# Patient Record
Sex: FEMALE | Race: WHITE | Employment: OTHER | URBAN - METROPOLITAN AREA
[De-identification: names, ages, dates, MRNs, and addresses within clinical notes are randomized per-mention and may not be internally consistent; named-entity substitution may affect disease eponyms.]

---

## 2017-02-23 ENCOUNTER — OFFICE VISIT (OUTPATIENT)
Dept: INTERNAL MEDICINE CLINIC | Age: 57
End: 2017-02-23

## 2017-02-23 VITALS
WEIGHT: 121 LBS | TEMPERATURE: 98 F | OXYGEN SATURATION: 98 % | RESPIRATION RATE: 16 BRPM | SYSTOLIC BLOOD PRESSURE: 98 MMHG | DIASTOLIC BLOOD PRESSURE: 55 MMHG | BODY MASS INDEX: 22.84 KG/M2 | HEART RATE: 81 BPM | HEIGHT: 61 IN

## 2017-02-23 DIAGNOSIS — E78.00 PURE HYPERCHOLESTEROLEMIA: ICD-10-CM

## 2017-02-23 DIAGNOSIS — Z00.00 ROUTINE GENERAL MEDICAL EXAMINATION AT A HEALTH CARE FACILITY: Primary | ICD-10-CM

## 2017-02-23 DIAGNOSIS — F34.1 DYSTHYMIA: ICD-10-CM

## 2017-02-23 RX ORDER — DULOXETIN HYDROCHLORIDE 30 MG/1
30 CAPSULE, DELAYED RELEASE ORAL DAILY
Qty: 30 CAP | Refills: 2 | Status: SHIPPED | OUTPATIENT
Start: 2017-02-23 | End: 2017-08-23

## 2017-02-23 NOTE — PROGRESS NOTES
HISTORY OF PRESENT ILLNESS  Ham Bourne is a 61 y.o. female. HPI   Subjective:   Ham Bourne is a 61 y.o. female with hyperlipidemia. Cardiovascular risk analysis - 61 y.o. female LDL goal is under 130. ROS: taking medications as instructed, no medication side effects noted, no TIA's, no chest pain on exertion, no dyspnea on exertion, no swelling of ankles. Tolerating meds, no myalgias or other side effects noted  New concerns: stable   She has been struggling with fever/chills, sinus congestion- she is feeling better and feels tired ; She did get sad with anything as she could not work on her pelvic floor . Review of Systems   Constitutional: Negative for chills, diaphoresis, fever, malaise/fatigue and weight loss. HENT: Negative for congestion, ear pain, hearing loss, sore throat and tinnitus. Eyes: Negative for blurred vision and double vision. Respiratory: Negative for cough, hemoptysis, sputum production, shortness of breath and wheezing. Cardiovascular: Negative for chest pain, palpitations, orthopnea, claudication, leg swelling and PND. Gastrointestinal: Negative for abdominal pain, blood in stool, constipation, diarrhea, heartburn, nausea and vomiting. Genitourinary: Negative for dysuria, flank pain, frequency, hematuria and urgency. Musculoskeletal: Negative for back pain, joint pain, myalgias and neck pain. Skin: Negative. Neurological: Negative for dizziness, tingling, sensory change, speech change, focal weakness, seizures, loss of consciousness, weakness and headaches. Endo/Heme/Allergies: Negative for environmental allergies and polydipsia. Does not bruise/bleed easily. Psychiatric/Behavioral: Negative for depression, memory loss, substance abuse and suicidal ideas. The patient is not nervous/anxious and does not have insomnia. Physical Exam   Constitutional: She is oriented to person, place, and time. She appears well-developed and well-nourished. HENT:   Head: Normocephalic and atraumatic. Right Ear: External ear normal.   Left Ear: External ear normal.   Nose: Nose normal.   Mouth/Throat: Oropharynx is clear and moist.   Eyes: Conjunctivae and EOM are normal. Pupils are equal, round, and reactive to light. Neck: Normal range of motion. Neck supple. Carotid bruit is not present. No thyromegaly present. Cardiovascular: Normal rate, regular rhythm, S1 normal, S2 normal, normal heart sounds and intact distal pulses. Pulmonary/Chest: Effort normal and breath sounds normal.   Abdominal: Soft. Normal appearance and bowel sounds are normal. There is no hepatosplenomegaly. There is no tenderness. Musculoskeletal: Normal range of motion. Neurological: She is alert and oriented to person, place, and time. Skin: Skin is warm, dry and intact. Psychiatric: She has a normal mood and affect. Her behavior is normal. Judgment and thought content normal.   Nursing note and vitals reviewed. ASSESSMENT and PLAN  Mary Ann Huff was seen today for complete physical.    Diagnoses and all orders for this visit:    Routine general medical examination at a health care facility  -     CBC W/O DIFF  -     METABOLIC PANEL, COMPREHENSIVE  -     TSH 3RD GENERATION  -     VITAMIN D, 25 HYDROXY    Pure hypercholesterolemia  -     LIPID PANEL    Dysthymia /anxiety- she needs to take this right now as she is in pain and having emotional issues associated with the pain   -     DULoxetine (CYMBALTA) 30 mg capsule; Take 1 Cap by mouth daily.       lab results and schedule of future lab studies reviewed with patient  reviewed diet, exercise and weight control  cardiovascular risk and specific lipid/LDL goals reviewed  reviewed medications and side effects in detail

## 2017-02-24 LAB
25(OH)D3+25(OH)D2 SERPL-MCNC: 37.2 NG/ML (ref 30–100)
ALBUMIN SERPL-MCNC: 4.7 G/DL (ref 3.5–5.5)
ALBUMIN/GLOB SERPL: 1.8 {RATIO} (ref 1.1–2.5)
ALP SERPL-CCNC: 84 IU/L (ref 39–117)
ALT SERPL-CCNC: 9 IU/L (ref 0–32)
APPEARANCE UR: CLEAR
AST SERPL-CCNC: 21 IU/L (ref 0–40)
BILIRUB SERPL-MCNC: 0.2 MG/DL (ref 0–1.2)
BILIRUB UR QL STRIP: NEGATIVE
BUN SERPL-MCNC: 23 MG/DL (ref 6–24)
BUN/CREAT SERPL: 22 (ref 9–23)
CALCIUM SERPL-MCNC: 9.5 MG/DL (ref 8.7–10.2)
CHLORIDE SERPL-SCNC: 99 MMOL/L (ref 96–106)
CHOLEST SERPL-MCNC: 219 MG/DL (ref 100–199)
CO2 SERPL-SCNC: 21 MMOL/L (ref 18–29)
COLOR UR: YELLOW
CREAT SERPL-MCNC: 1.05 MG/DL (ref 0.57–1)
ERYTHROCYTE [DISTWIDTH] IN BLOOD BY AUTOMATED COUNT: 13.9 % (ref 12.3–15.4)
GLOBULIN SER CALC-MCNC: 2.6 G/DL (ref 1.5–4.5)
GLUCOSE SERPL-MCNC: 75 MG/DL (ref 65–99)
GLUCOSE UR QL: NEGATIVE
HCT VFR BLD AUTO: 43 % (ref 34–46.6)
HDLC SERPL-MCNC: 67 MG/DL
HGB BLD-MCNC: 14.2 G/DL (ref 11.1–15.9)
HGB UR QL STRIP: NEGATIVE
INTERPRETATION, 910389: NORMAL
INTERPRETATION: NORMAL
KETONES UR QL STRIP: NEGATIVE
LDLC SERPL CALC-MCNC: 134 MG/DL (ref 0–99)
LEUKOCYTE ESTERASE UR QL STRIP: NEGATIVE
MCH RBC QN AUTO: 31.3 PG (ref 26.6–33)
MCHC RBC AUTO-ENTMCNC: 33 G/DL (ref 31.5–35.7)
MCV RBC AUTO: 95 FL (ref 79–97)
MICRO URNS: NORMAL
NITRITE UR QL STRIP: NEGATIVE
PDF IMAGE, 910387: NORMAL
PH UR STRIP: 6.5 [PH] (ref 5–7.5)
PLATELET # BLD AUTO: 325 X10E3/UL (ref 150–379)
POTASSIUM SERPL-SCNC: 4.6 MMOL/L (ref 3.5–5.2)
PROT SERPL-MCNC: 7.3 G/DL (ref 6–8.5)
PROT UR QL STRIP: NEGATIVE
RBC # BLD AUTO: 4.53 X10E6/UL (ref 3.77–5.28)
SODIUM SERPL-SCNC: 141 MMOL/L (ref 134–144)
SP GR UR: 1.02 (ref 1–1.03)
TRIGL SERPL-MCNC: 90 MG/DL (ref 0–149)
TSH SERPL DL<=0.005 MIU/L-ACNC: 3.26 UIU/ML (ref 0.45–4.5)
UROBILINOGEN UR STRIP-MCNC: 0.2 MG/DL (ref 0.2–1)
VLDLC SERPL CALC-MCNC: 18 MG/DL (ref 5–40)
WBC # BLD AUTO: 6.7 X10E3/UL (ref 3.4–10.8)

## 2017-04-13 ENCOUNTER — OFFICE VISIT (OUTPATIENT)
Dept: INTERNAL MEDICINE CLINIC | Age: 57
End: 2017-04-13

## 2017-04-13 VITALS
OXYGEN SATURATION: 98 % | SYSTOLIC BLOOD PRESSURE: 79 MMHG | HEART RATE: 102 BPM | RESPIRATION RATE: 16 BRPM | BODY MASS INDEX: 22.43 KG/M2 | TEMPERATURE: 98.4 F | DIASTOLIC BLOOD PRESSURE: 50 MMHG | WEIGHT: 118.8 LBS | HEIGHT: 61 IN

## 2017-04-13 DIAGNOSIS — R68.89 FLU-LIKE SYMPTOMS: Primary | ICD-10-CM

## 2017-04-13 DIAGNOSIS — J06.9 UPPER RESPIRATORY TRACT INFECTION, UNSPECIFIED TYPE: ICD-10-CM

## 2017-04-13 RX ORDER — AZITHROMYCIN 250 MG/1
250 TABLET, FILM COATED ORAL SEE ADMIN INSTRUCTIONS
Qty: 6 TAB | Refills: 0 | Status: SHIPPED | OUTPATIENT
Start: 2017-04-13 | End: 2017-04-18

## 2017-04-13 NOTE — PROGRESS NOTES
HISTORY OF PRESENT ILLNESS  Moustapha Yates is a 61 y.o. female. HPI     She complains of sinus congestion with green mucus, cough, body aches, chills, bilateral ear pain, low grade fever, weakness, and fatigue. Reports her highest fever was 100.4 degrees. Pt states that body aches, chills, and fever started on 4/10/17. Pt states that she feels a little short of breath and feels like she is going to pass out with deep breathing. Pt states that yesterday she did not eat or drink anything. Pt denies chest tightness. Reports this is the second time she has been sick in the past two weeks. Pt states that last week with her cold she had a fever and her entire body broke out in a rash. Her initial bp was 79/50 today in the office and pt attributes this to dehydration as she has not been eating or drinking anything. I rechecked her bp and systolic pressure was 98. Review of Systems   Constitutional: Positive for chills, fever and malaise/fatigue. Positive for body aches   HENT: Positive for congestion and ear pain. Respiratory: Positive for cough. Neurological: Positive for weakness. All other systems reviewed and are negative. Physical Exam   Constitutional: She is oriented to person, place, and time. She appears well-developed and well-nourished. HENT:   Head: Normocephalic and atraumatic. Right Ear: External ear normal.   Left Ear: External ear normal.   Nose: Nose normal.   Mouth/Throat: Oropharynx is clear and moist.   Eyes: Conjunctivae and EOM are normal.   Neck: Normal range of motion. Neck supple. Carotid bruit is not present. No thyroid mass and no thyromegaly present. Cardiovascular: Normal rate, regular rhythm, S1 normal, S2 normal, normal heart sounds and intact distal pulses. Pulmonary/Chest: Effort normal and breath sounds normal.   Abdominal: Soft. Normal appearance and bowel sounds are normal. There is no hepatosplenomegaly. There is no tenderness.    Musculoskeletal: Normal range of motion. Neurological: She is alert and oriented to person, place, and time. She has normal strength. No cranial nerve deficit or sensory deficit. Coordination normal.   Skin: Skin is warm, dry and intact. No abrasion and no rash noted. Psychiatric: She has a normal mood and affect. Her behavior is normal. Judgment and thought content normal.   Nursing note and vitals reviewed. ASSESSMENT and PLAN  Sanjay Dixon was seen today for nasal congestion. Diagnoses and all orders for this visit:    Flu-like symptoms  Flu test was negative in the office today. Pt to begin taking Zpak. Pt needs to push fluids. If she continues to feel weak, not able to eat or drink, and still running a fever she should be seen in the ED. Upper respiratory tract infection, unspecified type  -     azithromycin (ZITHROMAX) 250 mg tablet; Take 1 Tab by mouth See Admin Instructions for 5 days. reviewed medications and side effects in detail     Written by Maura Sharma, as dictated by Tiera Arocs MD.     Current diagnosis and concerns discussed with pt at length. Understands risks and benefits or current treatment plan and medications and accepts the treatment and medication with any possible risks. Pt asks appropriate questions which were answered. Pt instructed to call with any concerns or problems.

## 2017-05-17 RX ORDER — SIMVASTATIN 20 MG/1
TABLET, FILM COATED ORAL
Qty: 90 TAB | Refills: 1 | Status: SHIPPED | OUTPATIENT
Start: 2017-05-17 | End: 2017-11-15 | Stop reason: SDUPTHER

## 2017-07-10 ENCOUNTER — TELEPHONE (OUTPATIENT)
Dept: INTERNAL MEDICINE CLINIC | Age: 57
End: 2017-07-10

## 2017-07-10 ENCOUNTER — OFFICE VISIT (OUTPATIENT)
Dept: INTERNAL MEDICINE CLINIC | Age: 57
End: 2017-07-10

## 2017-07-10 VITALS
WEIGHT: 125 LBS | BODY MASS INDEX: 23.6 KG/M2 | HEIGHT: 61 IN | OXYGEN SATURATION: 97 % | SYSTOLIC BLOOD PRESSURE: 108 MMHG | HEART RATE: 100 BPM | DIASTOLIC BLOOD PRESSURE: 73 MMHG | TEMPERATURE: 98.5 F | RESPIRATION RATE: 16 BRPM

## 2017-07-10 DIAGNOSIS — L03.116 CELLULITIS OF LEFT LOWER EXTREMITY: Primary | ICD-10-CM

## 2017-07-10 DIAGNOSIS — T14.8XXA BRUISING: ICD-10-CM
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
## 2017-07-10 RX ORDER — NITROFURANTOIN (MACROCRYSTALS) 100 MG/1
CAPSULE ORAL
COMMUNITY
Start: 2017-07-08 | End: 2017-07-17 | Stop reason: ALTCHOICE

## 2017-07-10 RX ORDER — TRAMADOL HYDROCHLORIDE 50 MG/1
TABLET ORAL
COMMUNITY
Start: 2017-07-08 | End: 2017-08-23

## 2017-07-10 RX ORDER — AMOXICILLIN AND CLAVULANATE POTASSIUM 875; 125 MG/1; MG/1
1 TABLET, FILM COATED ORAL EVERY 12 HOURS
Qty: 20 TAB | Refills: 0 | Status: SHIPPED | OUTPATIENT
Start: 2017-07-10 | End: 2017-08-23

## 2017-07-10 RX ORDER — CYCLOBENZAPRINE HCL 10 MG
TABLET ORAL
COMMUNITY
Start: 2017-07-08 | End: 2017-08-23
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
## 2017-07-17 ENCOUNTER — HOSPITAL ENCOUNTER (OUTPATIENT)
Dept: GENERAL RADIOLOGY | Age: 57
Discharge: HOME | End: 2017-07-17
Attending: INTERNAL MEDICINE
Payer: COMMERCIAL

## 2017-07-17 ENCOUNTER — OFFICE VISIT (OUTPATIENT)
Dept: INTERNAL MEDICINE CLINIC | Age: 57
End: 2017-07-17

## 2017-07-17 VITALS
WEIGHT: 122.8 LBS | BODY MASS INDEX: 23.18 KG/M2 | OXYGEN SATURATION: 98 % | HEIGHT: 61 IN | SYSTOLIC BLOOD PRESSURE: 97 MMHG | TEMPERATURE: 97.9 F | RESPIRATION RATE: 14 BRPM | DIASTOLIC BLOOD PRESSURE: 57 MMHG | HEART RATE: 110 BPM

## 2017-07-17 DIAGNOSIS — L03.90 CELLULITIS, UNSPECIFIED CELLULITIS SITE: ICD-10-CM

## 2017-07-17 DIAGNOSIS — M25.552 LEFT HIP PAIN: ICD-10-CM

## 2017-07-17 DIAGNOSIS — M54.50 ACUTE BILATERAL LOW BACK PAIN WITHOUT SCIATICA: ICD-10-CM

## 2017-07-17 DIAGNOSIS — M54.50 ACUTE BILATERAL LOW BACK PAIN WITHOUT SCIATICA: Primary | ICD-10-CM

## 2017-07-17 PROCEDURE — 72100 X-RAY EXAM L-S SPINE 2/3 VWS: CPT

## 2017-07-17 PROCEDURE — 73502 X-RAY EXAM HIP UNI 2-3 VIEWS: CPT

## 2017-07-17 PROCEDURE — 72202 X-RAY EXAM SI JOINTS 3/> VWS: CPT

## 2017-07-17 PROCEDURE — 72200 X-RAY EXAM SI JOINTS: CPT

## 2017-07-17 RX ORDER — HYDROCODONE BITARTRATE AND ACETAMINOPHEN 5; 325 MG/1; MG/1
1 TABLET ORAL
Qty: 14 TAB | Refills: 0 | Status: SHIPPED | OUTPATIENT
Start: 2017-07-17 | End: 2017-08-23

## 2017-07-17 NOTE — PROGRESS NOTES
HISTORY OF PRESENT ILLNESS  Isabela Braun is a 61 y.o. female. HPI   Presents today for follow up. Presents today with . Patient reports she has been feeling better. Patient reports the abrasion and cellulitis have been healing well. Patient reports she has been having trouble sleeping because of the back pain. She has run out of Tramadol and has been having trouble sleeping for the past few nights. Patient has been taking Tylenol with no relief. Review of Systems   All other systems reviewed and are negative. Physical Exam   Constitutional: She is oriented to person, place, and time. She appears well-developed and well-nourished. HENT:   Head: Normocephalic and atraumatic. Right Ear: External ear normal.   Left Ear: External ear normal.   Nose: Nose normal.   Mouth/Throat: Oropharynx is clear and moist.   Eyes: Conjunctivae and EOM are normal.   Neck: Normal range of motion. Neck supple. Carotid bruit is not present. No thyroid mass and no thyromegaly present. Cardiovascular: Normal rate, regular rhythm, S1 normal, S2 normal, normal heart sounds and intact distal pulses. Pulmonary/Chest: Effort normal and breath sounds normal.   Abdominal: Soft. Normal appearance and bowel sounds are normal. There is no hepatosplenomegaly. There is no tenderness. Musculoskeletal: Normal range of motion. 3x4cm hardened area on left lateral side. Suspect area is consistent with hematoma. Neurological: She is alert and oriented to person, place, and time. She has normal strength. No cranial nerve deficit or sensory deficit. Coordination normal.   Skin: Skin is warm, dry and intact. No abrasion and no rash noted. Psychiatric: She has a normal mood and affect. Her behavior is normal. Judgment and thought content normal.   Nursing note and vitals reviewed. ASSESSMENT and PLAN  Stacey Gaytan was seen today for follow-up.     Diagnoses and all orders for this visit:    Acute bilateral low back pain without sciatica  Patient has not had XR since the incident. Need to make sure nothing is broken. Discussed patient will need PT to help her back after she fully recovers. Patient will follow up with me when she needs the PT. Patient has run out of Tramadol and is having trouble sleeping because of the pain. -     XR SPINE LUMB 2 OR 3 V; Future  -     XR SI JTS MAX 2 V; Future  -     HYDROcodone-acetaminophen (NORCO) 5-325 mg per tablet; Take 1 Tab by mouth every four (4) hours as needed for Pain. Max Daily Amount: 6 Tabs. Left hip pain  Patient has not had XR since the incident. Need to make sure nothing is broken. Discussed patient will need PT to help her back after she fully recovers. Patient will follow up with me when she needs the PT.  -     XR HIP LT W OR WO PELV 2-3 VWS; Future  -     HYDROcodone-acetaminophen (NORCO) 5-325 mg per tablet; Take 1 Tab by mouth every four (4) hours as needed for Pain. Max Daily Amount: 6 Tabs. Cellulitis, unspecified cellulitis site  Cellulitis is much better. Keep watching the hardened area. Suspect area is consistent with hematoma. lab results and schedule of future lab studies reviewed with patient  reviewed diet, exercise and weight control    Written by Francisco J Jarrett, as dictated by Clifford Owens MD.     Current diagnosis and concerns discussed with pt at length. Understands risks and benefits or current treatment plan and medications and accepts the treatment and medication with any possible risks. Pt asks appropriate questions which were answered. Pt instructed to call with any concerns or problems.

## 2017-08-23 ENCOUNTER — OFFICE VISIT (OUTPATIENT)
Dept: INTERNAL MEDICINE CLINIC | Age: 57
End: 2017-08-23

## 2017-08-23 VITALS
HEIGHT: 61 IN | RESPIRATION RATE: 16 BRPM | HEART RATE: 79 BPM | OXYGEN SATURATION: 99 % | WEIGHT: 123 LBS | BODY MASS INDEX: 23.22 KG/M2 | DIASTOLIC BLOOD PRESSURE: 66 MMHG | TEMPERATURE: 98.2 F | SYSTOLIC BLOOD PRESSURE: 103 MMHG

## 2017-08-23 DIAGNOSIS — M62.89 PELVIC FLOOR DYSFUNCTION: ICD-10-CM

## 2017-08-23 DIAGNOSIS — E78.00 PURE HYPERCHOLESTEROLEMIA: Primary | ICD-10-CM
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
## 2017-11-16 ENCOUNTER — TELEPHONE (OUTPATIENT)
Dept: INTERNAL MEDICINE CLINIC | Age: 57
End: 2017-11-16

## 2017-11-16 RX ORDER — SIMVASTATIN 20 MG/1
TABLET, FILM COATED ORAL
Qty: 90 TAB | Refills: 1 | Status: SHIPPED | OUTPATIENT
Start: 2017-11-16 | End: 2018-05-13 | Stop reason: SDUPTHER

## 2017-12-20 ENCOUNTER — OFFICE VISIT (OUTPATIENT)
Dept: INTERNAL MEDICINE CLINIC | Age: 57
End: 2017-12-20

## 2017-12-20 VITALS
BODY MASS INDEX: 22.66 KG/M2 | HEART RATE: 73 BPM | SYSTOLIC BLOOD PRESSURE: 98 MMHG | OXYGEN SATURATION: 99 % | WEIGHT: 120 LBS | DIASTOLIC BLOOD PRESSURE: 63 MMHG | RESPIRATION RATE: 16 BRPM | TEMPERATURE: 98 F | HEIGHT: 61 IN

## 2017-12-20 DIAGNOSIS — J40 BRONCHITIS: Primary | ICD-10-CM

## 2017-12-20 RX ORDER — HYDROMORPHONE HYDROCHLORIDE 2 MG/1
TABLET ORAL
COMMUNITY
End: 2018-04-06 | Stop reason: ALTCHOICE

## 2017-12-20 RX ORDER — ACETAMINOPHEN 325 MG/1
TABLET ORAL
COMMUNITY

## 2017-12-20 RX ORDER — ALBUTEROL SULFATE 90 UG/1
1 AEROSOL, METERED RESPIRATORY (INHALATION)
Qty: 1 INHALER | Refills: 0 | Status: SHIPPED | OUTPATIENT
Start: 2017-12-20 | End: 2018-12-19 | Stop reason: ALTCHOICE

## 2017-12-20 RX ORDER — BENZONATATE 200 MG/1
200 CAPSULE ORAL
Qty: 21 CAP | Refills: 0 | Status: SHIPPED | OUTPATIENT
Start: 2017-12-20 | End: 2017-12-27

## 2017-12-20 RX ORDER — AZITHROMYCIN 250 MG/1
250 TABLET, FILM COATED ORAL SEE ADMIN INSTRUCTIONS
Qty: 6 TAB | Refills: 0 | Status: SHIPPED | OUTPATIENT
Start: 2017-12-20 | End: 2017-12-25

## 2017-12-20 NOTE — MR AVS SNAPSHOT
Visit Information Date & Time Provider Department Dept. Phone Encounter #  
 12/20/2017 10:00 AM Anoop Humphrey MD [facility] [phone] [id number] Your Appointments 2/27/2018  9:15 AM  
COMPLETE PHYSICAL with Nina Arana MD  
[facility]) Appt Note: cpe no pap [address] [place]  [id number]  
[phone]  
  
   
 [address] [facility] [id number] Upcoming Health Maintenance Date Due Pneumococcal 19-64 Highest Risk (2 of 3 - PCV13) 6/6/2017 PAP AKA CERVICAL CYTOLOGY 3/2/2018 COLONOSCOPY 11/16/2019 DTaP/Tdap/Td series (2 - Td) 6/16/2020 Allergies as of 12/20/2017  Review Complete On: 92/50/3735 By: Johnathon Rabago Severity Noted Reaction Type Reactions Codeine    Nausea Only Current Immunizations  Reviewed on 12/9/2014 Name Date Influenza Vaccine 11/25/2017, 10/15/2014 Influenza Vaccine Split 10/18/2010 Influenza Vaccine Whole 10/23/2011 TD Vaccine 5/28/1998 TDAP Vaccine 6/16/2010 Not reviewed this visit You Were Diagnosed With   
  
 Codes Comments Bronchitis    -  Primary ICD-10-CM: N40 ICD-9-CM: 035 Vitals BP Pulse Temp Resp Height(growth percentile) Weight(growth percentile) 98/63 (BP 1 Location: Left arm, BP Patient Position: Sitting) 73 98 °F (36.7 °C) (Oral) 16 5' 1\" (1.549 m) 120 lb (54.4 kg) SpO2 BMI OB Status Smoking Status 99% 22.67 kg/m2 Hysterectomy Never Smoker Vitals History BMI and BSA Data Body Mass Index Body Surface Area  
 22.67 kg/m 2 1.53 m 2 Preferred Pharmacy Pharmacy Name Phone [id number] [address], [address] [place] [address]  [phone] Your Updated Medication List  
  
   
This list is accurate as of: 12/20/17 10:48 AM.  Always use your most recent med list.  
  
  
  
  
 albuterol 90 mcg/actuation inhaler Commonly known as:  PROVENTIL HFA, VENTOLIN HFA, PROAIR HFA Take 1 Puff by inhalation every four (4) hours as needed for Wheezing. aspirin 81 mg tablet Take 81 mg by mouth daily. Indications: MYOCARDIAL INFARCTION PREVENTION  
  
 azithromycin 250 mg tablet Commonly known as:  Cristiane Ruel Take 1 Tab by mouth See Admin Instructions for 5 days. benzonatate 200 mg capsule Commonly known as:  TESSALON Take 1 Cap by mouth three (3) times daily as needed for Cough for up to 7 days. HYDROmorphone 2 mg tablet Commonly known as:  DILAUDID Take  by mouth every four (4) hours as needed for Pain. ibuprofen 200 mg tablet Commonly known as:  MOTRIN Take 400 mg by mouth every eight (8) hours as needed for Pain. M-VIT PO Take  by mouth daily. NEURONTIN 300 mg capsule Generic drug:  gabapentin Take 300 mg by mouth two (2) times a day. * simvastatin 40 mg tablet Commonly known as:  ZOCOR Take 1 Tab by mouth nightly for 90 days. * simvastatin 20 mg tablet Commonly known as:  ZOCOR  
TAKE 1 TABLET BY MOUTH EVERY NIGHT  
  
 TYLENOL 325 mg tablet Generic drug:  acetaminophen Take  by mouth every four (4) hours as needed for Pain. * Notice: This list has 2 medication(s) that are the same as other medications prescribed for you. Read the directions carefully, and ask your doctor or other care provider to review them with you. Prescriptions Sent to Pharmacy Refills  
 azithromycin (ZITHROMAX) 250 mg tablet 0 Sig: Take 1 Tab by mouth See Admin Instructions for 5 days. Class: Normal  
 Pharmacy: [id number]- [address] [facility], [address] [place] [address] Dr Ph #: [phone] Route: Oral  
 benzonatate (TESSALON) 200 mg capsule 0 Sig: Take 1 Cap by mouth three (3) times daily as needed for Cough for up to 7 days.   
 Class: Normal  
 [id number] [address] [facility], [id number] [place] [address] Dr Ph #: [phone] Route: Oral  
 albuterol (PROVENTIL HFA, VENTOLIN HFA, PROAIR HFA) 90 mcg/actuation inhaler 0 Sig: Take 1 Puff by inhalation every four (4) hours as needed for Wheezing. Class: Normal  
 [id number] [address] [facility], [address] [place] [address] Dr Ph #: [phone] Route: Inhalation Patient Instructions Bronchitis: Care Instructions Your Care Instructions Bronchitis is inflammation of the bronchial tubes, which carry air to the lungs. The tubes swell and produce mucus, or phlegm. The mucus and inflamed bronchial tubes make you cough. You may have trouble breathing. Most cases of bronchitis are caused by viruses like those that cause colds. Antibiotics usually do not help and they may be harmful. Bronchitis usually develops rapidly and lasts about 2 to 3 weeks in otherwise healthy people. Follow-up care is a key part of your treatment and safety. Be sure to make and go to all appointments, and call your doctor if you are having problems. It's also a good idea to know your test results and keep a list of the medicines you take. How can you care for yourself at home? · Take all medicines exactly as prescribed. Call your doctor if you think you are having a problem with your medicine. · Get some extra rest. 
· Take an over-the-counter pain medicine, such as acetaminophen (Tylenol), ibuprofen (Advil, Motrin), or naproxen (Aleve) to reduce fever and relieve body aches. Read and follow all instructions on the label. · Do not take two or more pain medicines at the same time unless the doctor told you to. Many pain medicines have acetaminophen, which is Tylenol. Too much acetaminophen (Tylenol) can be harmful. · Take an over-the-counter cough medicine that contains dextromethorphan to help quiet a dry, hacking cough so that you can sleep.  Avoid cough medicines that have more than one active ingredient. Read and follow all instructions on the label. · Breathe moist air from a humidifier, hot shower, or sink filled with hot water. The heat and moisture will thin mucus so you can cough it out. · Do not smoke. Smoking can make bronchitis worse. If you need help quitting, talk to your doctor about stop-smoking programs and medicines. These can increase your chances of quitting for good. When should you call for help? Call 911 anytime you think you may need emergency care. For example, call if: 
? · You have severe trouble breathing. ?Call your doctor now or seek immediate medical care if: 
? · You have new or worse trouble breathing. ? · You cough up dark brown or bloody mucus (sputum). ? · You have a new or higher fever. ? · You have a new rash. ? Watch closely for changes in your health, and be sure to contact your doctor if: 
? · You cough more deeply or more often, especially if you notice more mucus or a change in the color of your mucus. ? · You are not getting better as expected. Where can you learn more? Go to http://fran-isis.info/. Enter H333 in the search box to learn more about \"Bronchitis: Care Instructions. \" Current as of: May 12, 2017 Content Version: 11.4 © 5554-7042 [employer]. Care instructions adapted under license by [employer] (which disclaims liability or warranty for this information). If you have questions about a medical condition or this instruction, always ask your healthcare professional. [place] [address] any warranty or liability for your use of this information. Introducing [facility]! Dear Monique Keller: Thank you for requesting a Access Point account. Our records indicate that you already have an active Access Point account. You can access your account anytime at [email or web address] Did you know that you can access your hospital and ER discharge instructions at any time in Nanochip? You can also review all of your test results from your hospital stay or ER visit. Additional Information If you have questions, please visit the Frequently Asked Questions section of the Nanochip website at [email or web address] Remember, Nanochip is NOT to be used for urgent needs. For medical emergencies, dial 911. Now available from your iPhone and Android! Please provide this summary of care documentation to your next provider. Your primary care clinician is listed as Nico Felix. If you have any questions after today's visit, please call [phone].

## 2017-12-20 NOTE — PROGRESS NOTES
Víctor Quijano is a 61 y.o. female who presents today for Cough  . She has a history of   Patient Active Problem List   Diagnosis Code    AR (allergic rhinitis) J30.9    Panic disorder F41.0    [facility] C56.9    Neuropathy G62.9    Diverticulosis, sigmoid K57.30    Migraines G43.909    Other B-complex deficiencies E53.8    Cholesterol serum increased     FCO (obstructive sleep apnea) G47.33    Pure hypercholesterolemia E78.00    Hair loss L65.9   . Today patient is here for an acute visit. Upper respiratory illness:  Víctor Quijano presents with complaints of congestion, sore throat, productive cough, fever and hoarseness for 7 days. no nausea and no vomiting . Symptoms are moderate and getting a bit worse. .  Over-the-counter remedies including: nothing. Pt notes that she has not been having much wheezing. Had a DaVinci procedure on 12/8 pelvic floor/bladder surgey. Drinking plenty of fluids: yes  Asthma?:  no  non-smoker  Contacts with similar infections: yes, . ROS  Review of Systems   Constitutional: Negative for chills, fever and weight loss. HENT: Positive for congestion. Negative for ear discharge, ear pain, hearing loss, nosebleeds, sinus pain, sore throat and tinnitus. Eyes: Negative for blurred vision, double vision, photophobia and pain. Respiratory: Positive for cough and hemoptysis. Negative for sputum production, shortness of breath, wheezing and stridor. Cardiovascular: Negative for chest pain, palpitations, claudication and leg swelling. Gastrointestinal: Negative for abdominal pain, nausea and vomiting. Genitourinary: Negative for dysuria, frequency, hematuria and urgency. Neurological: Negative. Endo/Heme/Allergies: Does not bruise/bleed easily. Psychiatric/Behavioral: Negative for depression. The patient is not nervous/anxious.         Visit Vitals    BP 98/63 (BP 1 Location: Left arm, BP Patient Position: Sitting)    Pulse 73    Temp 98 °F (36.7 °C) (Oral)    Resp 16    Ht 5' 1\" (1.549 m)    Wt 120 lb (54.4 kg)    SpO2 99%    BMI 22.67 kg/m2       Physical Exam   Constitutional: She is oriented to person, place, and time. She appears well-developed and well-nourished. HENT:   Head: Normocephalic and atraumatic. Right Ear: External ear normal.   Left Ear: External ear normal.   Neck: Normal range of motion. Neck supple. No thyromegaly present. Cardiovascular: Normal rate and regular rhythm. No murmur heard. Pulmonary/Chest: Effort normal. No respiratory distress. She has wheezes. She has no rales. She exhibits no tenderness. Coarse BS   Abdominal: Soft. Bowel sounds are normal. She exhibits no distension. Neurological: She is alert and oriented to person, place, and time. Skin: Skin is warm and dry. Psychiatric: She has a normal mood and affect. Her behavior is normal.         Current Outpatient Prescriptions   Medication Sig    acetaminophen (TYLENOL) 325 mg tablet Take  by mouth every four (4) hours as needed for Pain.  HYDROmorphone (DILAUDID) 2 mg tablet Take  by mouth every four (4) hours as needed for Pain.  simvastatin (ZOCOR) 20 mg tablet TAKE 1 TABLET BY MOUTH EVERY NIGHT    aspirin 81 mg tablet Take 81 mg by mouth daily. Indications: MYOCARDIAL INFARCTION PREVENTION    gabapentin (NEURONTIN) 300 mg capsule Take 300 mg by mouth two (2) times a day.  PV W-O ARABELLA/FERROUS FUMARATE/FA (M-VIT PO) Take  by mouth daily.  simvastatin (ZOCOR) 40 mg tablet Take 1 Tab by mouth nightly for 90 days.  ibuprofen (MOTRIN) 200 mg tablet Take 400 mg by mouth every eight (8) hours as needed for Pain. No current facility-administered medications for this visit.          Past Medical History:   Diagnosis Date    AR (allergic rhinitis)     [facility]    [facility]    Cholesterol serum increased     Diverticulosis, sigmoid     Hair loss 2011    saw dr Lourena Holter Migraines     Neuropathy from chemo    FCO (obstructive sleep apnea)     intolerant CPAP    Other B-complex deficiencies     Pelvic floor dysfunction       Past Surgical History:   Procedure Laterality Date    ENDOSCOPY, COLON, DIAGNOSTIC  04; 09    HX BLADDER REPAIR  12/08/2017    HX HYSTERECTOMY  80    HX SALPINGO-OOPHORECTOMY  00    radical      Social History   Substance Use Topics    Smoking status: Never Smoker    Smokeless tobacco: Never Used    Alcohol use Yes      Comment: social      Family History   Problem Relation Age of Onset    Cancer Mother      lung    Elevated Lipids Mother     Seizures Mother     Heart Disease Father     Emphysema Sister     Other Sister      seizures    High Cholesterol Sister    [address] [facility] Arthritis-rheumatoid Sister     Depression Sister     Stroke Sister 46     ministroke    Heart Disease Sister     Elevated Lipids Sister     Hypertension Sister     High Cholesterol Sister         Allergies   Allergen Reactions    Codeine Nausea Only        Assessment/Plan  Diagnoses and all orders for this visit:    1. Bronchitis -   -     azithromycin (ZITHROMAX) 250 mg tablet; Take 1 Tab by mouth See Admin Instructions for 5 days. -     benzonatate (TESSALON) 200 mg capsule; Take 1 Cap by mouth three (3) times daily as needed for Cough for up to 7 days. -     albuterol (PROVENTIL HFA, VENTOLIN HFA, PROAIR HFA) 90 mcg/actuation inhaler; Take 1 Puff by inhalation every four (4) hours as needed for Wheezing.         Follow-up Disposition: Not on File    Nitin Keller MD  12/20/2017

## 2017-12-20 NOTE — PATIENT INSTRUCTIONS
Bronchitis: Care Instructions  Your Care Instructions    Bronchitis is inflammation of the bronchial tubes, which carry air to the lungs. The tubes swell and produce mucus, or phlegm. The mucus and inflamed bronchial tubes make you cough. You may have trouble breathing. Most cases of bronchitis are caused by viruses like those that cause colds. Antibiotics usually do not help and they may be harmful. Bronchitis usually develops rapidly and lasts about 2 to 3 weeks in otherwise healthy people. Follow-up care is a key part of your treatment and safety. Be sure to make and go to all appointments, and call your doctor if you are having problems. It's also a good idea to know your test results and keep a list of the medicines you take. How can you care for yourself at home? · Take all medicines exactly as prescribed. Call your doctor if you think you are having a problem with your medicine. · Get some extra rest.  · Take an over-the-counter pain medicine, such as acetaminophen (Tylenol), ibuprofen (Advil, Motrin), or naproxen (Aleve) to reduce fever and relieve body aches. Read and follow all instructions on the label. · Do not take two or more pain medicines at the same time unless the doctor told you to. Many pain medicines have acetaminophen, which is Tylenol. Too much acetaminophen (Tylenol) can be harmful. · Take an over-the-counter cough medicine that contains dextromethorphan to help quiet a dry, hacking cough so that you can sleep. Avoid cough medicines that have more than one active ingredient. Read and follow all instructions on the label. · Breathe moist air from a humidifier, hot shower, or sink filled with hot water. The heat and moisture will thin mucus so you can cough it out. · Do not smoke. Smoking can make bronchitis worse. If you need help quitting, talk to your doctor about stop-smoking programs and medicines. These can increase your chances of quitting for good.   When should you call for help? Call 911 anytime you think you may need emergency care. For example, call if:  ? · You have severe trouble breathing. ?Call your doctor now or seek immediate medical care if:  ? · You have new or worse trouble breathing. ? · You cough up dark brown or bloody mucus (sputum). ? · You have a new or higher fever. ? · You have a new rash. ? Watch closely for changes in your health, and be sure to contact your doctor if:  ? · You cough more deeply or more often, especially if you notice more mucus or a change in the color of your mucus. ? · You are not getting better as expected. Where can you learn more? Go to http://fran-isis.info/. Enter H333 in the search box to learn more about \"Bronchitis: Care Instructions. \"  Current as of: May 12, 2017  Content Version: 11.4  © 5296-2458 [employer]. Care instructions adapted under license by [employer]an [email or web address] (which disclaims liability or warranty for this information). If you have questions about a medical condition or this instruction, always ask your healthcare professional. [address] any warranty or liability for your use of this information.

## 2018-02-27 ENCOUNTER — OFFICE VISIT (OUTPATIENT)
Dept: INTERNAL MEDICINE CLINIC | Age: 58
End: 2018-02-27

## 2018-02-27 VITALS
HEART RATE: 71 BPM | HEIGHT: 61 IN | WEIGHT: 121 LBS | RESPIRATION RATE: 14 BRPM | OXYGEN SATURATION: 98 % | BODY MASS INDEX: 22.84 KG/M2 | DIASTOLIC BLOOD PRESSURE: 52 MMHG | TEMPERATURE: 98.1 F | SYSTOLIC BLOOD PRESSURE: 96 MMHG

## 2018-02-27 DIAGNOSIS — R06.83 SNORING: ICD-10-CM

## 2018-02-27 DIAGNOSIS — N39.0 RECURRENT UTI: ICD-10-CM

## 2018-02-27 DIAGNOSIS — Z00.00 ROUTINE GENERAL MEDICAL EXAMINATION AT A HEALTH CARE FACILITY: Primary | ICD-10-CM

## 2018-02-27 DIAGNOSIS — E78.00 PURE HYPERCHOLESTEROLEMIA: ICD-10-CM

## 2018-02-27 NOTE — PROGRESS NOTES
HISTORY OF PRESENT ILLNESS  Evelina Go is a 61 y.o. female. HPI  Corey Rizvi did surgery 12/08/2018- had to remove scar tissue and did bladder surgery with DaVinci- she was in hospital for two night and then she got sick while in hospital ; it took awhile to get over surgery and not in exercise program- some of pelvic pain is better and not as much pressure but now she has had a couple of bladder infections and a little leaking ; she still has a nerve pain but pressure is not as bad     Subjective:   Evelina Go is a 61 y.o. female with hyperlipidemia. Cardiovascular risk analysis - 61 y.o. female LDL goal is under 130. ROS: taking medications as instructed, no medication side effects noted, no TIA's, no chest pain on exertion, no dyspnea on exertion, no swelling of ankles. Tolerating meds, no myalgias or other side effects noted  New concerns: stable tolerating medicine . She has had sleep apnea in past and c annot use machine due to being claustrophobic and her dentist thinks she can get retested and try new machines   Review of Systems   Constitutional: Negative for chills, diaphoresis, fever, malaise/fatigue and weight loss. HENT: Negative for congestion, ear pain, hearing loss, sore throat and tinnitus. Eyes: Negative for blurred vision and double vision. Respiratory: Negative for cough, hemoptysis, sputum production, shortness of breath and wheezing. Cardiovascular: Negative for chest pain, palpitations, orthopnea, claudication, leg swelling and PND. Gastrointestinal: Negative for abdominal pain, blood in stool, constipation, diarrhea, heartburn, nausea and vomiting. Genitourinary: Negative for dysuria, flank pain, frequency, hematuria and urgency. Musculoskeletal: Negative for back pain, joint pain, myalgias and neck pain. Skin: Negative.     Neurological: Negative for dizziness, tingling, sensory change, speech change, focal weakness, seizures, loss of consciousness, weakness and headaches. Endo/Heme/Allergies: Negative for environmental allergies and polydipsia. Does not bruise/bleed easily. Psychiatric/Behavioral: Negative for depression, memory loss, substance abuse and suicidal ideas. The patient is not nervous/anxious and does not have insomnia. Physical Exam   Constitutional: She is oriented to person, place, and time. She appears well-developed and well-nourished. HENT:   Head: Normocephalic and atraumatic. Right Ear: External ear normal.   Left Ear: External ear normal.   Nose: Nose normal.   Mouth/Throat: Oropharynx is clear and moist.   Eyes: Conjunctivae and EOM are normal. Pupils are equal, round, and reactive to light. Neck: Normal range of motion. Neck supple. Carotid bruit is not present. No thyromegaly present. Cardiovascular: Normal rate, regular rhythm, S1 normal, S2 normal, normal heart sounds and intact distal pulses. Pulmonary/Chest: Effort normal and breath sounds normal.   Abdominal: Soft. Normal appearance and bowel sounds are normal. There is no hepatosplenomegaly. There is no tenderness. Musculoskeletal: Normal range of motion. Neurological: She is alert and oriented to person, place, and time. Skin: Skin is warm, dry and intact. Psychiatric: She has a normal mood and affect. Her behavior is normal. Judgment and thought content normal.   Nursing note and vitals reviewed. ASSESSMENT and PLAN  Diagnoses and all orders for this visit:    1. Routine general medical examination at a health care facility  -     CBC W/O DIFF  -     VITAMIN D, 25 HYDROXY    2. Pure hypercholesterolemia-cont with current emdicine no signs of muscle aches  -     METABOLIC PANEL, COMPREHENSIVE  -     LIPID PANEL    3. Recurrent UTI-she is followed by Alexsander Qureshi and is going to see him tomorrow and get urine rechecked     4.  Snoring  -     REFERRAL TO PULMONARY DISEASE      lab results and schedule of future lab studies reviewed with patient  reviewed diet, exercise and weight control  cardiovascular risk and specific lipid/LDL goals reviewed  reviewed medications and side effects in detail

## 2018-02-28 LAB
25(OH)D3+25(OH)D2 SERPL-MCNC: 40.5 NG/ML (ref 30–100)
ALBUMIN SERPL-MCNC: 4.5 G/DL (ref 3.5–5.5)
ALBUMIN/GLOB SERPL: 1.7 {RATIO} (ref 1.2–2.2)
ALP SERPL-CCNC: 96 IU/L (ref 39–117)
ALT SERPL-CCNC: 8 IU/L (ref 0–32)
AST SERPL-CCNC: 20 IU/L (ref 0–40)
BILIRUB SERPL-MCNC: 0.3 MG/DL (ref 0–1.2)
BUN SERPL-MCNC: 19 MG/DL (ref 6–24)
BUN/CREAT SERPL: 24 (ref 9–23)
CALCIUM SERPL-MCNC: 9.8 MG/DL (ref 8.7–10.2)
CHLORIDE SERPL-SCNC: 102 MMOL/L (ref 96–106)
CHOLEST SERPL-MCNC: 203 MG/DL (ref 100–199)
CO2 SERPL-SCNC: 23 MMOL/L (ref 18–29)
CREAT SERPL-MCNC: 0.8 MG/DL (ref 0.57–1)
ERYTHROCYTE [DISTWIDTH] IN BLOOD BY AUTOMATED COUNT: 13.1 % (ref 12.3–15.4)
GFR SERPLBLD CREATININE-BSD FMLA CKD-EPI: 82 ML/MIN/1.73
GFR SERPLBLD CREATININE-BSD FMLA CKD-EPI: 94 ML/MIN/1.73
GLOBULIN SER CALC-MCNC: 2.6 G/DL (ref 1.5–4.5)
GLUCOSE SERPL-MCNC: 84 MG/DL (ref 65–99)
HCT VFR BLD AUTO: 41.3 % (ref 34–46.6)
HDLC SERPL-MCNC: 63 MG/DL
HGB BLD-MCNC: 13.6 G/DL (ref 11.1–15.9)
INTERPRETATION, 910389: NORMAL
LDLC SERPL CALC-MCNC: 121 MG/DL (ref 0–99)
MCH RBC QN AUTO: 32 PG (ref 26.6–33)
MCHC RBC AUTO-ENTMCNC: 32.9 G/DL (ref 31.5–35.7)
MCV RBC AUTO: 97 FL (ref 79–97)
PLATELET # BLD AUTO: 256 X10E3/UL (ref 150–379)
POTASSIUM SERPL-SCNC: 4.6 MMOL/L (ref 3.5–5.2)
PROT SERPL-MCNC: 7.1 G/DL (ref 6–8.5)
RBC # BLD AUTO: 4.25 X10E6/UL (ref 3.77–5.28)
SODIUM SERPL-SCNC: 143 MMOL/L (ref 134–144)
TRIGL SERPL-MCNC: 94 MG/DL (ref 0–149)
VLDLC SERPL CALC-MCNC: 19 MG/DL (ref 5–40)
WBC # BLD AUTO: 5 X10E3/UL (ref 3.4–10.8)

## 2018-03-22 ENCOUNTER — TELEPHONE (OUTPATIENT)
Dept: INTERNAL MEDICINE CLINIC | Age: 58
End: 2018-03-22

## 2018-04-06 ENCOUNTER — OFFICE VISIT (OUTPATIENT)
Dept: INTERNAL MEDICINE CLINIC | Age: 58
End: 2018-04-06

## 2018-04-06 VITALS
TEMPERATURE: 99.2 F | SYSTOLIC BLOOD PRESSURE: 101 MMHG | HEIGHT: 61 IN | WEIGHT: 123.6 LBS | HEART RATE: 94 BPM | BODY MASS INDEX: 23.33 KG/M2 | RESPIRATION RATE: 12 BRPM | OXYGEN SATURATION: 97 % | DIASTOLIC BLOOD PRESSURE: 67 MMHG

## 2018-04-06 DIAGNOSIS — R68.89 FLU-LIKE SYMPTOMS: Primary | ICD-10-CM

## 2018-04-06 DIAGNOSIS — R05.9 COUGH: ICD-10-CM

## 2018-04-06 LAB
FLUAV+FLUBV AG NOSE QL IA.RAPID: NEGATIVE POS/NEG
FLUAV+FLUBV AG NOSE QL IA.RAPID: NEGATIVE POS/NEG
VALID INTERNAL CONTROL?: YES

## 2018-04-06 RX ORDER — BENZONATATE 100 MG/1
100 CAPSULE ORAL
Qty: 30 CAP | Refills: 0 | Status: SHIPPED | OUTPATIENT
Start: 2018-04-06 | End: 2018-04-13

## 2018-04-06 RX ORDER — OSELTAMIVIR PHOSPHATE 75 MG/1
75 CAPSULE ORAL 2 TIMES DAILY
Qty: 10 CAP | Refills: 0 | Status: SHIPPED | OUTPATIENT
Start: 2018-04-06 | End: 2018-04-11

## 2018-04-06 RX ORDER — ONDANSETRON 4 MG/1
4 TABLET, ORALLY DISINTEGRATING ORAL
Qty: 20 TAB | Refills: 0 | Status: SHIPPED | OUTPATIENT
Start: 2018-04-06 | End: 2018-12-19 | Stop reason: ALTCHOICE

## 2018-04-06 NOTE — PATIENT INSTRUCTIONS

## 2018-04-06 NOTE — PROGRESS NOTES
HISTORY OF PRESENT ILLNESS  Penny Reynoso is a 60 y.o. female. HPI  Upper respiratory illness:  Penny Reynoso presents with complaints of congestion, dry cough, myalgias, headache, fever, chills and clear nasal discharge for 2 days. Symptoms began suddenly on 4/4 pm and has been running fevers from 101-102. Taking Tylenol to keep fever down. Had some nausea and vomiting on 4/4 pm but has been drinking fluids without issues yesterday and today . she has not had  Shortness of breath or wheezing. Symptoms are moderate. Over-the-counter remedies including Tylenol   has been used with good relief of symptoms. Drinking plenty of fluids: yes  Asthma?:  no  non-smoker  Contacts with similar infections: no       Review of Systems   Constitutional: Positive for chills, fever and malaise/fatigue. HENT: Positive for congestion. Negative for sinus pain and sore throat. Respiratory: Negative for sputum production, shortness of breath and wheezing. Cardiovascular: Negative for chest pain and palpitations. Gastrointestinal: Positive for nausea and vomiting. Negative for abdominal pain, constipation and diarrhea. Genitourinary: Negative for dysuria and frequency. Musculoskeletal: Positive for myalgias. Neurological: Positive for headaches. Negative for dizziness and tingling. /67 (BP 1 Location: Left arm, BP Patient Position: Sitting)  Pulse 94  Temp 99.2 °F (37.3 °C) (Oral)   Resp 12  Ht 5' 1\" (1.549 m)  Wt 123 lb 9.6 oz (56.1 kg)  SpO2 97%  BMI 23.35 kg/m2  Physical Exam   Constitutional: She is oriented to person, place, and time. She appears well-developed and well-nourished. Appears tired   HENT:   Head: Normocephalic and atraumatic. Right Ear: External ear normal.   Left Ear: External ear normal.   Nose: Mucosal edema present. Right sinus exhibits no maxillary sinus tenderness. Left sinus exhibits no maxillary sinus tenderness.    Mouth/Throat: Oropharynx is clear and moist. No posterior oropharyngeal erythema. Neck: Normal range of motion. Neck supple. No thyromegaly present. Cardiovascular: Normal rate and regular rhythm. Pulmonary/Chest: Effort normal and breath sounds normal. She has no wheezes. Lymphadenopathy:     She has no cervical adenopathy. Neurological: She is alert and oriented to person, place, and time. Skin: Skin is warm and dry. Psychiatric: She has a normal mood and affect. Her behavior is normal.   Nursing note and vitals reviewed. ASSESSMENT and PLAN  Diagnoses and all orders for this visit:    1. Flu-like symptoms  -     AMB POC CASSANDRA INFLUENZA A/B TEST  -     oseltamivir (TAMIFLU) 75 mg capsule; Take 1 Cap by mouth two (2) times a day for 5 days. 2. Cough  -     benzonatate (TESSALON) 100 mg capsule; Take 1 Cap by mouth three (3) times daily as needed for Cough for up to 7 days. -     ondansetron (ZOFRAN ODT) 4 mg disintegrating tablet; Take 1 Tab by mouth every eight (8) hours as needed for Nausea. Follow up if signs and symptoms worsen or change. After hours number given.    lab results and schedule of future lab studies reviewed with patient  reviewed diet, exercise and weight control  reviewed medications and side effects in detail

## 2018-04-06 NOTE — MR AVS SNAPSHOT
[address] [facility] 
 
 
 [address] [facility] 100[address] [facility] 
[phone] Patient: Cristina Loza MRN: [id number] OPN:[id number] Visit Information Date & Time Provider Department Dept. Phone Encounter #  
 4/6/2018  2:00 PM Kristopher Membreno NP [facility] [address] [id number] Upcoming Health Maintenance Date Due Pneumococcal 19-64 Highest Risk (2 of 3 - PCV13) 6/6/2017 BREAST CANCER SCRN MAMMOGRAM 9/1/2017 PAP AKA CERVICAL CYTOLOGY 3/2/2018 COLONOSCOPY 11/16/2019 DTaP/Tdap/Td series (2 - Td) 6/16/2020 Allergies as of 4/6/2018  Review Complete On: 4/6/2018 By: Kristopher Membreno NP Severity Noted Reaction Type Reactions Codeine    Nausea Only Current Immunizations  Reviewed on 12/9/2014 Name Date Influenza Vaccine 11/25/2017, 10/15/2014 Influenza Vaccine Split 10/18/2010 Influenza Vaccine Whole 10/23/2011 TD Vaccine 5/28/1998 TDAP Vaccine 6/16/2010 Not reviewed this visit You Were Diagnosed With   
  
 Codes Comments Flu-like symptoms    -  Primary ICD-10-CM: R68.89 ICD-9-CM: 780.99 Cough     ICD-10-CM: R05 ICD-9-CM: 973. 2 Vitals BP Pulse Temp Resp Height(growth percentile) Weight(growth percentile) 101/67 (BP 1 Location: Left arm, BP Patient Position: Sitting) 94 99.2 °F (37.3 °C) (Oral) 12 5' 1\" (1.549 m) 123 lb 9.6 oz (56.1 kg) SpO2 BMI OB Status Smoking Status 97% 23.35 kg/m2 Hysterectomy Never Smoker BMI and BSA Data Body Mass Index Body Surface Area  
 23.35 kg/m 2 1.55 m 2 Preferred Pharmacy Pharmacy Name Phone [id number] [address], [address] [place] [address]  [phone] Your Updated Medication List  
  
   
This list is accurate as of 4/6/18  2:46 PM.  Always use your most recent med list.  
  
  
  
  
 albuterol 90 mcg/actuation inhaler Commonly known as:  PROVENTIL HFA, VENTOLIN HFA, PROAIR HFA Take 1 Puff by inhalation every four (4) hours as needed for Wheezing. aspirin 81 mg tablet Take 81 mg by mouth daily. Indications: MYOCARDIAL INFARCTION PREVENTION  
  
 benzonatate 100 mg capsule Commonly known as:  TESSALON Take 1 Cap by mouth three (3) times daily as needed for Cough for up to 7 days. M-VIT PO Take  by mouth daily. NEURONTIN 300 mg capsule Generic drug:  gabapentin Take 300 mg by mouth two (2) times a day. ondansetron 4 mg disintegrating tablet Commonly known as:  ZOFRAN ODT Take 1 Tab by mouth every eight (8) hours as needed for Nausea. oseltamivir 75 mg capsule Commonly known as:  TAMIFLU Take 1 Cap by mouth two (2) times a day for 5 days. * simvastatin 40 mg tablet Commonly known as:  ZOCOR Take 1 Tab by mouth nightly for 90 days. * simvastatin 20 mg tablet Commonly known as:  ZOCOR  
TAKE 1 TABLET BY MOUTH EVERY NIGHT  
  
 TYLENOL 325 mg tablet Generic drug:  acetaminophen Take  by mouth every four (4) hours as needed for Pain. * Notice: This list has 2 medication(s) that are the same as other medications prescribed for you. Read the directions carefully, and ask your doctor or other care provider to review them with you. Prescriptions Sent to Pharmacy Refills  
 oseltamivir (TAMIFLU) 75 mg capsule 0 Sig: Take 1 Cap by mouth two (2) times a day for 5 days. Class: Normal  
 Pharmacy: [facility] #[id number]- [address] Ph #: [phone] Route: Oral  
 benzonatate (TESSALON) 100 mg capsule 0 Sig: Take 1 Cap by mouth three (3) times daily as needed for Cough for up to 7 days. Class: Normal  
 Pharmacy: [facility]/pharmacy #[id number]- [address] Ph #: [phone]  Route: Oral  
 ondansetron (ZOFRAN ODT) 4 mg disintegrating tablet 0  
 Sig: Take 1 Tab by mouth every eight (8) hours as needed for Nausea. Class: Normal  
 [id number] [address] [facility], [address] [place] [address] Dr Mitchell #: [phone] Route: Oral  
  
We Performed the Following AMB POC CASSANDRA INFLUENZA A/B TEST [39187 CPT(R)] Patient Instructions Influenza (Flu): Care Instructions Your Care Instructions Influenza (flu) is an infection in the lungs and breathing passages. It is caused by the influenza virus. There are different strains, or types, of the flu virus from year to year. Unlike the common cold, the flu comes on suddenly and the symptoms, such as a cough, congestion, fever, chills, fatigue, aches, and pains, are more severe. These symptoms may last up to 10 days. Although the flu can make you feel very sick, it usually doesn't cause serious health problems. Home treatment is usually all you need for flu symptoms. But your doctor may prescribe antiviral medicine to prevent other health problems, such as pneumonia, from developing. Older people and those who have a long-term health condition, such as lung disease, are most at risk for having pneumonia or other health problems. Follow-up care is a key part of your treatment and safety. Be sure to make and go to all appointments, and call your doctor if you are having problems. It's also a good idea to know your test results and keep a list of the medicines you take. How can you care for yourself at home? · Get plenty of rest. 
· Drink plenty of fluids, enough so that your urine is light yellow or clear like water. If you have kidney, heart, or liver disease and have to limit fluids, talk with your doctor before you increase the amount of fluids you drink. · Take an over-the-counter pain medicine if needed, such as acetaminophen (Tylenol), ibuprofen (Advil, Motrin), or naproxen (Aleve), to relieve fever, headache, and muscle aches.  Read and follow all instructions on the label. No one younger than 20 should take aspirin. It has been linked to Reye syndrome, a serious illness. · Do not smoke. Smoking can make the flu worse. If you need help quitting, talk to your doctor about stop-smoking programs and medicines. These can increase your chances of quitting for good. · Breathe moist air from a hot shower or from a sink filled with hot water to help clear a stuffy nose. · Before you use cough and cold medicines, check the label. These medicines may not be safe for young children or for people with certain health problems. · If the skin around your nose and lips becomes sore, put some petroleum jelly on the area. · To ease coughing: ¨ Drink fluids to soothe a scratchy throat. ¨ Suck on cough drops or plain hard candy. ¨ Take an over-the-counter cough medicine that contains dextromethorphan to help you get some sleep. Read and follow all instructions on the label. ¨ Raise your head at night with an extra pillow. This may help you rest if coughing keeps you awake. · Take any prescribed medicine exactly as directed. Call your doctor if you think you are having a problem with your medicine. To avoid spreading the flu · Wash your hands regularly, and keep your hands away from your face. · Stay home from school, work, and other public places until you are feeling better and your fever has been gone for at least 24 hours. The fever needs to have gone away on its own without the help of medicine. · Ask people living with you to talk to their doctors about preventing the flu. They may get antiviral medicine to keep from getting the flu from you. · To prevent the flu in the future, get a flu vaccine every fall. Encourage people living with you to get the vaccine. · Cover your mouth when you cough or sneeze. When should you call for help? Call 911 anytime you think you may need emergency care. For example, call if: 
? · You have severe trouble breathing. ?Call your doctor now or seek immediate medical care if: 
? · You have new or worse trouble breathing. ? · You seem to be getting much sicker. ? · You feel very sleepy or confused. ? · You have a new or higher fever. ? · You get a new rash. ? Watch closely for changes in your health, and be sure to contact your doctor if: 
? · You begin to get better and then get worse. ? · You are not getting better after 1 week. Where can you learn more? Go to http://fran-isis.info/. Enter N375 in the search box to learn more about \"Influenza (Flu): Care Instructions. \" Current as of: May 12, 2017 Content Version: 11.4 © 7793-9113 [employer]. Care instructions adapted under license by [employer] (which disclaims liability or warranty for this information). If you have questions about a medical condition or this instruction, always ask your healthcare professional. [place] [address] any warranty or liability for your use of this information. Introducing [address]! Dear Anamika Campos: Thank you for requesting a [employer] account. Our records indicate that you already have an active [employer] account. You can access your account anytime at [email or web address] Did you know that you can access your hospital and ER discharge instructions at any time in [employer]? You can also review all of your test results from your hospital stay or ER visit. Additional Information If you have questions, please visit the Frequently Asked Questions section of the [employer] website at [email or web address] Remember, [employer] is NOT to be used for urgent needs. For medical emergencies, dial 911. Now available from your iPhone and Android! Please provide this summary of care documentation to your next provider. Your primary care clinician is listed as Kathe Anguiano.  If you have any questions after today's visit, please call [phone].

## 2018-05-13 RX ORDER — SIMVASTATIN 20 MG/1
TABLET, FILM COATED ORAL
Qty: 90 TAB | Refills: 1 | Status: SHIPPED | OUTPATIENT
Start: 2018-05-13 | End: 2018-11-12 | Stop reason: SDUPTHER

## 2018-11-12 RX ORDER — SIMVASTATIN 20 MG/1
TABLET, FILM COATED ORAL
Qty: 30 TAB | Refills: 0 | Status: SHIPPED | OUTPATIENT
Start: 2018-11-12 | End: 2018-12-17 | Stop reason: SDUPTHER

## 2018-12-13 RX ORDER — SIMVASTATIN 20 MG/1
TABLET, FILM COATED ORAL
Qty: 30 TAB | Refills: 0 | OUTPATIENT
Start: 2018-12-13

## 2018-12-17 ENCOUNTER — TELEPHONE (OUTPATIENT)
Dept: INTERNAL MEDICINE CLINIC | Age: 58
End: 2018-12-17

## 2018-12-17 RX ORDER — SIMVASTATIN 20 MG/1
20 TABLET, FILM COATED ORAL
Qty: 30 TAB | Refills: 0 | Status: SHIPPED | OUTPATIENT
Start: 2018-12-17 | End: 2018-12-19 | Stop reason: SDUPTHER

## 2018-12-17 NOTE — TELEPHONE ENCOUNTER
Eliza Mueller [facility] Front Office Pool             Pt request for a refill her :Simvastatin\" medication to be sent to her pharmacy. She has an appointment scheudled for 1/03/19 but her medication will run out next week. Best contact number is [phone].       [id number] [address], [address] [place] [address]  [phone]

## 2018-12-17 NOTE — TELEPHONE ENCOUNTER
Jd Christensen [facility] Front Office Pool             Pt request for a refill her :Simvastatin\" medication to be sent to her pharmacy. She has an appointment scheudled for 1/03/19 but her medication will run out next week. Best contact number is [phone].

## 2018-12-19 ENCOUNTER — OFFICE VISIT (OUTPATIENT)
Dept: INTERNAL MEDICINE CLINIC | Age: 58
End: 2018-12-19

## 2018-12-19 VITALS
TEMPERATURE: 97.6 F | HEART RATE: 81 BPM | HEIGHT: 61 IN | OXYGEN SATURATION: 97 % | WEIGHT: 125.8 LBS | BODY MASS INDEX: 23.75 KG/M2 | RESPIRATION RATE: 14 BRPM | SYSTOLIC BLOOD PRESSURE: 103 MMHG | DIASTOLIC BLOOD PRESSURE: 69 MMHG

## 2018-12-19 DIAGNOSIS — M79.605 PAIN IN BOTH LOWER EXTREMITIES: ICD-10-CM

## 2018-12-19 DIAGNOSIS — M79.604 PAIN IN BOTH LOWER EXTREMITIES: ICD-10-CM

## 2018-12-19 DIAGNOSIS — E78.00 PURE HYPERCHOLESTEROLEMIA: Primary | ICD-10-CM

## 2018-12-19 RX ORDER — SIMVASTATIN 20 MG/1
TABLET, FILM COATED ORAL
Qty: 90 TAB | Refills: 1 | Status: SHIPPED | OUTPATIENT
Start: 2018-12-19 | End: 2019-05-31 | Stop reason: SDUPTHER

## 2018-12-19 NOTE — PROGRESS NOTES
HISTORY OF PRESENT ILLNESS  Jeremiah Marcum is a 60 y.o. female. HPI  Hypercholesterolemia:  Cardiovascular risk analysis - 60 y.o. female LDL goal is under 130. ROS: taking medications as instructed, no medication side effects noted, no TIA's, no chest pain on exertion, no dyspnea on exertion, no swelling of ankles. Tolerating meds, no myalgias or other side effects noted  New concerns: Her last LDL was 121 on 2/27/18. Pt continues on Zocor. Recurrent UTI: Stable, with no recent UTI's. Pain in Both Lower Extremities: Pt c/o bilateral leg pain and numbness at night (right leg more than left leg). She exercises to manage pain and attributes symptoms to pelvic floor exercises. Pt endorses stable mood and bowels. She is expecting 3 grandchildren this upcoming year. Review of Systems   All other systems reviewed and are negative. Physical Exam   Constitutional: She is oriented to person, place, and time. She appears well-developed and well-nourished. HENT:   Head: Normocephalic and atraumatic. Right Ear: External ear normal.   Left Ear: External ear normal.   Nose: Nose normal.   Mouth/Throat: Oropharynx is clear and moist.   Eyes: Conjunctivae and EOM are normal.   Neck: Normal range of motion. Neck supple. Carotid bruit is not present. No thyroid mass and no thyromegaly present. Cardiovascular: Normal rate, regular rhythm, S1 normal, S2 normal, normal heart sounds and intact distal pulses. Pulmonary/Chest: Effort normal and breath sounds normal.   Abdominal: Soft. Normal appearance and bowel sounds are normal. There is no hepatosplenomegaly. There is no tenderness. Musculoskeletal: Normal range of motion. Neurological: She is alert and oriented to person, place, and time. She has normal strength. No cranial nerve deficit or sensory deficit. Coordination normal.   Skin: Skin is warm, dry and intact. No abrasion and no rash noted. Psychiatric: She has a normal mood and affect.  Her behavior is normal. Judgment and thought content normal.   Nursing note and vitals reviewed. ASSESSMENT and PLAN  Diagnoses and all orders for this visit:    1. Pure hypercholesterolemia  Stable, patient tolerating meds, no myalgias. I do not recommend any change in medications. -     simvastatin (ZOCOR) 20 mg tablet; TAKE 1 TABLET BY MOUTH EVERY DAY IN THE EVENING. OVERDUE FOR FOLLOW UP.  -     LIPID PANEL  -     METABOLIC PANEL, COMPREHENSIVE    2. Pain in both lower extremities  Stable, and well-managed w/o meds. Will monitor for any changes or improvements. -     CBC W/O DIFF     This note will not be viewable in [employer]. Lab results and schedule of future lab studies reviewed with patient. Reviewed diet, exercise and weight control. Written by Burton Hagan, as dictated by Michelle Ibrahim MD.     Current diagnosis and concerns discussed with pt at length. Understands risks and benefits or current treatment plan and medications and accepts the treatment and medication with any possible risks. Pt asks appropriate questions which were answered. Pt instructed to call with any concerns or problems.

## 2018-12-20 LAB
ALBUMIN SERPL-MCNC: 4.3 G/DL (ref 3.5–5.5)
ALBUMIN/GLOB SERPL: 2 {RATIO} (ref 1.2–2.2)
ALP SERPL-CCNC: 78 IU/L (ref 39–117)
ALT SERPL-CCNC: 7 IU/L (ref 0–32)
AST SERPL-CCNC: 21 IU/L (ref 0–40)
BILIRUB SERPL-MCNC: 0.2 MG/DL (ref 0–1.2)
BUN SERPL-MCNC: 25 MG/DL (ref 6–24)
BUN/CREAT SERPL: 25 (ref 9–23)
CALCIUM SERPL-MCNC: 9.4 MG/DL (ref 8.7–10.2)
CHLORIDE SERPL-SCNC: 103 MMOL/L (ref 96–106)
CHOLEST SERPL-MCNC: 181 MG/DL (ref 100–199)
CO2 SERPL-SCNC: 20 MMOL/L (ref 20–29)
CREAT SERPL-MCNC: 0.99 MG/DL (ref 0.57–1)
ERYTHROCYTE [DISTWIDTH] IN BLOOD BY AUTOMATED COUNT: 13 % (ref 12.3–15.4)
GLOBULIN SER CALC-MCNC: 2.2 G/DL (ref 1.5–4.5)
GLUCOSE SERPL-MCNC: 75 MG/DL (ref 65–99)
HCT VFR BLD AUTO: 39.3 % (ref 34–46.6)
HDLC SERPL-MCNC: 51 MG/DL
HGB BLD-MCNC: 13.3 G/DL (ref 11.1–15.9)
INTERPRETATION, 910389: NORMAL
LDLC SERPL CALC-MCNC: 104 MG/DL (ref 0–99)
MCH RBC QN AUTO: 31.9 PG (ref 26.6–33)
MCHC RBC AUTO-ENTMCNC: 33.8 G/DL (ref 31.5–35.7)
MCV RBC AUTO: 94 FL (ref 79–97)
PLATELET # BLD AUTO: 222 X10E3/UL (ref 150–379)
POTASSIUM SERPL-SCNC: 4.6 MMOL/L (ref 3.5–5.2)
PROT SERPL-MCNC: 6.5 G/DL (ref 6–8.5)
RBC # BLD AUTO: 4.17 X10E6/UL (ref 3.77–5.28)
SODIUM SERPL-SCNC: 141 MMOL/L (ref 134–144)
TRIGL SERPL-MCNC: 132 MG/DL (ref 0–149)
VLDLC SERPL CALC-MCNC: 26 MG/DL (ref 5–40)
WBC # BLD AUTO: 4.5 X10E3/UL (ref 3.4–10.8)

## 2019-01-23 ENCOUNTER — OFFICE VISIT (OUTPATIENT)
Dept: INTERNAL MEDICINE CLINIC | Age: 59
End: 2019-01-23

## 2019-01-23 VITALS
BODY MASS INDEX: 23.83 KG/M2 | OXYGEN SATURATION: 98 % | WEIGHT: 126.2 LBS | SYSTOLIC BLOOD PRESSURE: 98 MMHG | TEMPERATURE: 98 F | HEIGHT: 61 IN | RESPIRATION RATE: 18 BRPM | HEART RATE: 83 BPM | DIASTOLIC BLOOD PRESSURE: 65 MMHG

## 2019-01-23 DIAGNOSIS — Z23 ENCOUNTER FOR IMMUNIZATION: ICD-10-CM

## 2019-01-23 DIAGNOSIS — E78.00 PURE HYPERCHOLESTEROLEMIA: Primary | ICD-10-CM

## 2019-01-23 RX ORDER — IBUPROFEN 200 MG
200 TABLET ORAL
COMMUNITY
End: 2020-10-09 | Stop reason: ALTCHOICE

## 2019-01-23 NOTE — PROGRESS NOTES
HISTORY OF PRESENT ILLNESS Rey Benton is a 60 y.o. female. HPI Pt requests TDAP vaccine, b/c she is expecting grandbaby. Denies recent cold/flu. Hypercholesterolemia: 
Cardiovascular risk analysis - 60 y.o. female LDL goal is under 130. ROS: taking medications as instructed, no medication side effects noted, no TIA's, no chest pain on exertion, no dyspnea on exertion, no swelling of ankles. Tolerating meds, no myalgias or other side effects noted New concerns: Her last LDL was 104 on 12/19/18. Pt continues on Zocor. Pt followed up for mammogram. 
 
 
Review of Systems All other systems reviewed and are negative. Physical Exam  
Constitutional: She is oriented to person, place, and time. She appears well-developed and well-nourished. HENT:  
Head: Normocephalic and atraumatic. Right Ear: External ear normal.  
Left Ear: External ear normal.  
Nose: Nose normal.  
Mouth/Throat: Oropharynx is clear and moist.  
Eyes: Conjunctivae and EOM are normal.  
Neck: Normal range of motion. Neck supple. Carotid bruit is not present. No thyroid mass and no thyromegaly present. Cardiovascular: Normal rate, regular rhythm, S1 normal, S2 normal, normal heart sounds and intact distal pulses. Pulmonary/Chest: Effort normal and breath sounds normal.  
Abdominal: Soft. Normal appearance and bowel sounds are normal. There is no hepatosplenomegaly. There is no tenderness. Musculoskeletal: Normal range of motion. Neurological: She is alert and oriented to person, place, and time. She has normal strength. No cranial nerve deficit or sensory deficit. Coordination normal.  
Skin: Skin is warm, dry and intact. No abrasion and no rash noted. Psychiatric: She has a normal mood and affect. Her behavior is normal. Judgment and thought content normal.  
Nursing note and vitals reviewed. ASSESSMENT and PLAN Diagnoses and all orders for this visit: 1. Pure hypercholesterolemia Stable, patient tolerating meds, no myalgias. I do not recommend any change in medications. 2. Encounter for immunization Administered TDAP vaccine today in office.  
-     TETANUS, DIPHTHERIA TOXOIDS AND ACELLULAR PERTUSSIS VACCINE (TDAP), IN INDIVIDS. >=7, IM This note will not be viewable in [address] Lab results and schedule of future lab studies reviewed with patient. Reviewed diet, exercise and weight control. Written by Renu Jauregui, as dictated by Neeta Handley MD.  
 
Current diagnosis and concerns discussed with pt at length. Understands risks and benefits or current treatment plan and medications and accepts the treatment and medication with any possible risks. Pt asks appropriate questions which were answered. Pt instructed to call with any concerns or problems.

## 2019-03-06 ENCOUNTER — OFFICE VISIT (OUTPATIENT)
Dept: INTERNAL MEDICINE CLINIC | Age: 59
End: 2019-03-06

## 2019-03-06 VITALS
WEIGHT: 127 LBS | HEIGHT: 61 IN | TEMPERATURE: 99.3 F | BODY MASS INDEX: 23.98 KG/M2 | OXYGEN SATURATION: 95 % | RESPIRATION RATE: 20 BRPM | DIASTOLIC BLOOD PRESSURE: 69 MMHG | HEART RATE: 104 BPM | SYSTOLIC BLOOD PRESSURE: 103 MMHG

## 2019-03-06 DIAGNOSIS — R68.89 FLU-LIKE SYMPTOMS: Primary | ICD-10-CM

## 2019-03-06 DIAGNOSIS — E78.00 PURE HYPERCHOLESTEROLEMIA: ICD-10-CM

## 2019-03-06 LAB
QUICKVUE INFLUENZA TEST: NEGATIVE
VALID INTERNAL CONTROL?: YES

## 2019-03-06 RX ORDER — PREDNISONE 20 MG/1
TABLET ORAL
Qty: 10 TAB | Refills: 0 | Status: SHIPPED | OUTPATIENT
Start: 2019-03-06 | End: 2020-01-23 | Stop reason: ALTCHOICE

## 2019-03-06 RX ORDER — OSELTAMIVIR PHOSPHATE 75 MG/1
75 CAPSULE ORAL 2 TIMES DAILY
Qty: 10 CAP | Refills: 0 | Status: SHIPPED | OUTPATIENT
Start: 2019-03-06 | End: 2019-03-11

## 2019-03-06 RX ORDER — AZITHROMYCIN 250 MG/1
250 TABLET, FILM COATED ORAL SEE ADMIN INSTRUCTIONS
Qty: 6 TAB | Refills: 0 | Status: SHIPPED | OUTPATIENT
Start: 2019-03-06 | End: 2019-03-11

## 2019-03-06 NOTE — PROGRESS NOTES
HISTORY OF PRESENT ILLNESS  Moi Clifford is a 60 y.o. female. HPI  Flu-Like Symptoms: Pt started to experience deep cough, HA, body aches, fever, mild chest tightness, and fatigue yesterday. She was tested negative for flu today in clinic. She received flu shot this season. She declines Robafen at this time. Hypercholesterolemia:  Cardiovascular risk analysis - 60 y.o. female LDL goal is under 130. ROS: taking medications as instructed, no medication side effects noted, no TIA's, no chest pain on exertion, no dyspnea on exertion, no swelling of ankles. Tolerating meds, no myalgias or other side effects noted  New concerns: Her last LDL was 104 on 12/19/18. Pt continues on Zocor. Review of Systems   Constitutional: Positive for fever and malaise/fatigue. Respiratory: Positive for cough and wheezing. Neurological: Positive for headaches. All other systems reviewed and are negative. Physical Exam   Constitutional: She is oriented to person, place, and time. She appears well-developed and well-nourished. HENT:   Head: Normocephalic and atraumatic. Right Ear: External ear normal.   Left Ear: External ear normal.   Nose: Nose normal.   Mouth/Throat: Oropharynx is clear and moist.   Eyes: Conjunctivae and EOM are normal.   Neck: Normal range of motion. Neck supple. Carotid bruit is not present. No thyroid mass and no thyromegaly present. Cardiovascular: Normal rate, regular rhythm, S1 normal, S2 normal, normal heart sounds and intact distal pulses. Pulmonary/Chest: Effort normal and breath sounds normal.   Expiratory wheeze. Abdominal: Soft. Normal appearance and bowel sounds are normal. There is no hepatosplenomegaly. There is no tenderness. Musculoskeletal: Normal range of motion. Neurological: She is alert and oriented to person, place, and time. She has normal strength. No cranial nerve deficit or sensory deficit. Coordination normal.   Skin: Skin is warm, dry and intact.  No abrasion and no rash noted. Psychiatric: She has a normal mood and affect. Her behavior is normal. Judgment and thought content normal.   Nursing note and vitals reviewed. ASSESSMENT and PLAN  Diagnoses and all orders for this visit:    1. Flu-like symptoms  Prescribed Tamiflu, Azithromycin, and Prednisone. Discussed that, once finished with Tamiflu and if symptoms persist, pt should start on AB's. Pt was tested negative for flu today in clinic. Will monitor for any changes or improvements. -     AMB POC RAPID INFLUENZA TEST  -     oseltamivir (TAMIFLU) 75 mg capsule; Take 1 Cap by mouth two (2) times a day for 5 days. -     azithromycin (ZITHROMAX) 250 mg tablet; Take 1 Tab by mouth See Admin Instructions for 5 days. -     predniSONE (DELTASONE) 20 mg tablet; 2 every day for 5 days    2. Pure hypercholesterolemia  Stable, patient tolerating meds, no myalgias. I do not recommend any change in medications. Lab results and schedule of future lab studies reviewed with patient. Reviewed diet, exercise and weight control. Written by Catrachito Bello, as dictated by Nilo Rice MD.     Current diagnosis and concerns discussed with pt at length. Understands risks and benefits or current treatment plan and medications and accepts the treatment and medication with any possible risks. Pt asks appropriate questions which were answered. Pt instructed to call with any concerns or problems. This note will not be viewable in [address]

## 2019-05-31 DIAGNOSIS — E78.00 PURE HYPERCHOLESTEROLEMIA: ICD-10-CM

## 2019-05-31 RX ORDER — SIMVASTATIN 20 MG/1
TABLET, FILM COATED ORAL
Qty: 90 TAB | Refills: 1 | Status: SHIPPED | OUTPATIENT
Start: 2019-05-31 | End: 2019-11-27 | Stop reason: SDUPTHER

## 2019-11-27 DIAGNOSIS — E78.00 PURE HYPERCHOLESTEROLEMIA: ICD-10-CM

## 2019-11-27 RX ORDER — SIMVASTATIN 20 MG/1
TABLET, FILM COATED ORAL
Qty: 90 TAB | Refills: 1 | Status: SHIPPED | OUTPATIENT
Start: 2019-11-27 | End: 2020-07-06 | Stop reason: SDUPTHER

## 2020-01-23 ENCOUNTER — OFFICE VISIT (OUTPATIENT)
Dept: INTERNAL MEDICINE CLINIC | Age: 60
End: 2020-01-23

## 2020-01-23 VITALS
BODY MASS INDEX: 22.69 KG/M2 | HEART RATE: 63 BPM | OXYGEN SATURATION: 100 % | WEIGHT: 120.2 LBS | HEIGHT: 61 IN | SYSTOLIC BLOOD PRESSURE: 136 MMHG | DIASTOLIC BLOOD PRESSURE: 69 MMHG | RESPIRATION RATE: 16 BRPM | TEMPERATURE: 97.7 F

## 2020-01-23 DIAGNOSIS — M79.604 PAIN IN BOTH LOWER EXTREMITIES: ICD-10-CM

## 2020-01-23 DIAGNOSIS — G62.9 NEUROPATHY: ICD-10-CM

## 2020-01-23 DIAGNOSIS — E78.00 PURE HYPERCHOLESTEROLEMIA: Primary | ICD-10-CM

## 2020-01-23 DIAGNOSIS — R10.13 EPIGASTRIC ABDOMINAL PAIN: ICD-10-CM

## 2020-01-23 DIAGNOSIS — E78.00 PURE HYPERCHOLESTEROLEMIA: ICD-10-CM

## 2020-01-23 DIAGNOSIS — M79.605 PAIN IN BOTH LOWER EXTREMITIES: ICD-10-CM

## 2020-01-23 RX ORDER — OMEPRAZOLE 20 MG/1
20 CAPSULE, DELAYED RELEASE ORAL DAILY
Qty: 30 CAP | Refills: 3 | Status: SHIPPED | OUTPATIENT
Start: 2020-01-23 | End: 2020-10-09 | Stop reason: ALTCHOICE

## 2020-01-23 NOTE — PROGRESS NOTES
HISTORY OF PRESENT ILLNESS  Bill Guzmán is a 60 y.o. female. HPI  Hypercholesterolemia:  Cardiovascular risk analysis - 60 y.o. female LDL goal is under 130. ROS: taking medications as instructed, no medication side effects noted, no TIA's, no chest pain on exertion, no dyspnea on exertion, no swelling of ankles. Tolerating meds, no myalgias or other side effects noted  New concerns: Pt's last LDL was 104 on 12/19/18. Continues on Simvastatin 20 mg. Epigastric pain: She notes that she feels intermittent epigastric pain that is worse when she eats certain foods. This has been going on sporadically over the last few years but has gotten more frequent over the last 3 months. She notes that bread 'helps but not really'. She notes relief with pressure on the site. Denies sour taste in mouth, dark stools, change in bowels, or constant pain. Bilateral LE pain: She notes that when she sleeps she feels pain, and she is concerned. She worried about blood clots and circulation issues. She notes improvement in her pain with walking/exercise. Neuropathy: Continues on Neurontin 300 mg BID (per Dr. Magdy Silveira). She notes that when she gets up she experiences a rush and a HA. She notes that she has issues with her pelvic floor. Pt reports that she had a very relaxed holidays, so it was nice and different. Review of Systems   Gastrointestinal: Positive for abdominal pain (epigastric pain). Negative for blood in stool, constipation, diarrhea and melena. Musculoskeletal:        Bilat LE pain   All other systems reviewed and are negative. Physical Exam  Constitutional:       Appearance: Normal appearance. HENT:      Right Ear: Hearing, tympanic membrane and external ear normal.      Left Ear: Hearing, tympanic membrane and external ear normal.      Mouth/Throat:      Mouth: Mucous membranes are moist.      Pharynx: Oropharynx is clear.    Cardiovascular:      Rate and Rhythm: Normal rate and regular rhythm. Pulmonary:      Effort: Pulmonary effort is normal.      Breath sounds: Normal breath sounds and air entry. Abdominal:      General: Abdomen is flat. Bowel sounds are normal. There is no distension. Palpations: Abdomen is soft. There is no mass. Tenderness: There is no tenderness. There is no guarding or rebound. Hernia: No hernia is present. Comments: Pain dissipates when palpating her abdomen   Musculoskeletal: Normal range of motion. Skin:     General: Skin is warm and dry. Neurological:      General: No focal deficit present. Mental Status: She is alert and oriented to person, place, and time. Psychiatric:         Mood and Affect: Mood normal.         Behavior: Behavior normal.         ASSESSMENT and PLAN  Diagnoses and all orders for this visit:    1. Pure hypercholesterolemia  Presumed stable, will recheck labs today. Patient is tolerating medications with no myalgias. I do not recommend any change in treatment plan. -     METABOLIC PANEL, COMPREHENSIVE; Future  -     LIPID PANEL; Future    2. Neuropathy  Stable and well-managed with Neurontin 300 mg BID (per Dr. Abdelrahman Faye). No change in medications. Continues to f/u with her specialist.     3. Pain in both lower extremities  Ordered US of legs to r/o vasculare issues. Discussed with pt that she lacks clinical sx of blood clotting or circulation issues (calf pain/swelling, pain while walking or exercising). -     DUPLEX LOWER EXT VENOUS BILAT; Future    4. Epigastric abdominal pain  Prescribed Prilosec 20 mg and ordered US of abdomen. Advised pt to make an appt with Dr. Sumit Burton, and try Prilosec to take daily for 3 weeks and monitor for changes in her epigastric pain. If her sx do not dissipate then she needs to go through with the appt, if her sx dissipate- then she can cancel the appt. -     omeprazole (PRILOSEC) 20 mg capsule; Take 1 Cap by mouth daily.  -     CBC W/O DIFF;  Future  -     AMYLASE  - LIPASE  -     US ABD COMP; Future    Lab results and schedule of future lab studies reviewed with patient. Reviewed diet, exercise and weight control. Written by Dorie Wilson, as dictated by Darrel Garcia MD.     Current diagnosis and concerns discussed with pt at length. Understands risks and benefits or current treatment plan and medications and accepts the treatment and medication with any possible risks. Pt asks appropriate questions which were answered. Pt instructed to call with any concerns or problems. This note will not be viewable in [address]

## 2020-01-24 LAB
ALBUMIN SERPL-MCNC: 4.6 G/DL (ref 3.8–4.9)
ALBUMIN/GLOB SERPL: 1.8 {RATIO} (ref 1.2–2.2)
ALP SERPL-CCNC: 75 IU/L (ref 39–117)
ALT SERPL-CCNC: 8 IU/L (ref 0–32)
AMYLASE SERPL-CCNC: 139 U/L (ref 31–110)
AST SERPL-CCNC: 17 IU/L (ref 0–40)
BILIRUB SERPL-MCNC: 0.4 MG/DL (ref 0–1.2)
BUN SERPL-MCNC: 24 MG/DL (ref 8–27)
BUN/CREAT SERPL: 26 (ref 12–28)
CALCIUM SERPL-MCNC: 9.7 MG/DL (ref 8.7–10.3)
CHLORIDE SERPL-SCNC: 97 MMOL/L (ref 96–106)
CHOLEST SERPL-MCNC: 215 MG/DL (ref 100–199)
CO2 SERPL-SCNC: 19 MMOL/L (ref 20–29)
CREAT SERPL-MCNC: 0.93 MG/DL (ref 0.57–1)
ERYTHROCYTE [DISTWIDTH] IN BLOOD BY AUTOMATED COUNT: 11.7 % (ref 11.7–15.4)
GLOBULIN SER CALC-MCNC: 2.6 G/DL (ref 1.5–4.5)
GLUCOSE SERPL-MCNC: 88 MG/DL (ref 65–99)
HCT VFR BLD AUTO: 40.7 % (ref 34–46.6)
HDLC SERPL-MCNC: 58 MG/DL
HGB BLD-MCNC: 13.5 G/DL (ref 11.1–15.9)
INTERPRETATION, 910389: NORMAL
LDLC SERPL CALC-MCNC: 133 MG/DL (ref 0–99)
LIPASE SERPL-CCNC: 36 U/L (ref 14–72)
MCH RBC QN AUTO: 31.9 PG (ref 26.6–33)
MCHC RBC AUTO-ENTMCNC: 33.2 G/DL (ref 31.5–35.7)
MCV RBC AUTO: 96 FL (ref 79–97)
PLATELET # BLD AUTO: 303 X10E3/UL (ref 150–450)
POTASSIUM SERPL-SCNC: 4.4 MMOL/L (ref 3.5–5.2)
PROT SERPL-MCNC: 7.2 G/DL (ref 6–8.5)
RBC # BLD AUTO: 4.23 X10E6/UL (ref 3.77–5.28)
SODIUM SERPL-SCNC: 138 MMOL/L (ref 134–144)
TRIGL SERPL-MCNC: 122 MG/DL (ref 0–149)
VLDLC SERPL CALC-MCNC: 24 MG/DL (ref 5–40)
WBC # BLD AUTO: 5.8 X10E3/UL (ref 3.4–10.8)

## 2020-07-06 ENCOUNTER — VIRTUAL VISIT (OUTPATIENT)
Dept: INTERNAL MEDICINE CLINIC | Age: 60
End: 2020-07-06

## 2020-07-06 DIAGNOSIS — G62.9 NEUROPATHY: ICD-10-CM

## 2020-07-06 DIAGNOSIS — K21.9 GASTROESOPHAGEAL REFLUX DISEASE WITHOUT ESOPHAGITIS: ICD-10-CM

## 2020-07-06 DIAGNOSIS — E78.00 PURE HYPERCHOLESTEROLEMIA: Primary | ICD-10-CM

## 2020-07-06 RX ORDER — SIMVASTATIN 20 MG/1
20 TABLET, FILM COATED ORAL
Qty: 90 TAB | Refills: 1 | Status: SHIPPED | OUTPATIENT
Start: 2020-07-06 | End: 2021-01-03

## 2020-07-06 NOTE — PROGRESS NOTES
HISTORY OF PRESENT ILLNESS  Keith Bolden is a 63 y.o. female who is present, aware, and consenting for real-time synchronous virtual video visit through [employer]. HPI  Hyperlipidemia:  Cardiovascular risk analysis - 63 y.o. female LDL goal is under 130. ROS: taking medications as instructed, no medication side effects noted, no TIA's, no chest pain on exertion, no dyspnea on exertion, no swelling of ankles. Tolerating meds, no myalgias or other side effects noted. New concerns: Pt's last LDL was 133 on 1/23/20. Continues on Zocor and ASA. Pt endorses regularly walking and swimming. Neuropathy: Followed by Dr. Lam. Stable, pt continues to comply with Gabapentin BID. GERD: Followed by Dr. Regino Perez. Stable with reported flares, pt continues to comply with Prilosec. Pt had CAT scan in gallbladder area 2 weeks ago due to elevated pancreatic enzymes. Pt is planning on getting endoscopy in the future. Pt had blood work done at her last visit with Dr. Regino Perez at [facility]. Review of Systems   All other systems reviewed and are negative. Physical Exam  Vitals signs reviewed. Constitutional:       General: She is not in acute distress. Appearance: Normal appearance. She is not ill-appearing, toxic-appearing or diaphoretic. HENT:      Right Ear: Hearing normal.      Left Ear: Hearing normal.      Nose: Nose normal.      Mouth/Throat:      Mouth: Mucous membranes are moist.      Pharynx: Oropharynx is clear. Eyes:      Conjunctiva/sclera: Conjunctivae normal.   Neck:      Musculoskeletal: Normal range of motion. Pulmonary:      Effort: No respiratory distress. Breath sounds: Normal air entry. Musculoskeletal: Normal range of motion. Skin:     General: Skin is warm and dry. Neurological:      General: No focal deficit present. Mental Status: She is alert and oriented to person, place, and time. Mental status is at baseline.    Psychiatric:         Mood and Affect: Mood normal. Behavior: Behavior normal.         Thought Content: Thought content normal.         Judgment: Judgment normal.         ASSESSMENT and PLAN  Diagnoses and all orders for this visit:    1. Pure hypercholesterolemia  Presumed stable. Patient is tolerating medications with no myalgias. I do not recommend any change in treatment plan. Refilled pt's Zocor.   -     simvastatin (ZOCOR) 20 mg tablet; Take 1 Tab by mouth nightly. 2. Neuropathy  Stable and well-managed with Gabapentin. No change in medications. 3. Gastroesophageal reflux disease without esophagitis  Followed by Dr. Freddy Guadalupe. Stable and well-managed with some reported flares. Waiting on CAT scan and lab reports. No change in medications. Lab results and schedule of future lab studies reviewed with patient. Reviewed diet, exercise and weight control. Written by Chantal Moran, as dictated by Clarke Garcia MD.     Current diagnosis and concerns discussed with pt at length. Understands risks and benefits or current treatment plan and medications and accepts the treatment and medication with any possible risks. Pt asks appropriate questions which were answered. Pt instructed to call with any concerns or problems.

## 2020-10-06 ENCOUNTER — TELEPHONE (OUTPATIENT)
Dept: INTERNAL MEDICINE CLINIC | Age: 60
End: 2020-10-06

## 2020-10-06 NOTE — TELEPHONE ENCOUNTER
----- Message from Sharlene Rowe sent at 10/6/2020  9:29 AM EDT -----  Regarding: Dr. Asael Flores first and last name and relationship to patient (if not the patient): n/a  Best contact number: [phone]  Preferred date and time: first available virtual visit  Scheduled appointment date and time: n/a  Reason for appointment: Sick Visit - sinus, fever, & rash.   Details to clarify the request: n/a

## 2020-10-08 NOTE — TELEPHONE ENCOUNTER
Patient called following up on a message regarding ultrasound rescheduled for next week. Patient reports rash, sinus issues, low grade fever on Monday. Patient states these symptoms she has had in the past with infections. Patient scheduled with PCP [facility] 10/9/2020. Please call osman today to advise if PCP recommends she get covid test.       Please call as soon as possible today.  [phone]

## 2020-10-08 NOTE — TELEPHONE ENCOUNTER
Spoke with patient and advised her that she should go ahead and get an appointment to have a COVID-19 test done but also keep her virtual appt scheduled with Dr. Mamta Jackson tomorrow. Pt understood and was thankful for the call.

## 2020-10-09 ENCOUNTER — VIRTUAL VISIT (OUTPATIENT)
Dept: INTERNAL MEDICINE CLINIC | Age: 60
End: 2020-10-09
Payer: COMMERCIAL

## 2020-10-09 DIAGNOSIS — J01.00 ACUTE NON-RECURRENT MAXILLARY SINUSITIS: Primary | ICD-10-CM

## 2020-10-09 DIAGNOSIS — E78.00 PURE HYPERCHOLESTEROLEMIA: ICD-10-CM

## 2020-10-09 PROCEDURE — 99213 OFFICE O/P EST LOW 20 MIN: CPT | Performed by: INTERNAL MEDICINE

## 2020-10-09 RX ORDER — CALC/MAG/B COMPLEX/D3/HERB 61
TABLET ORAL
COMMUNITY
End: 2021-01-08 | Stop reason: ALTCHOICE

## 2020-10-09 RX ORDER — SUCRALFATE 1 G/1
1 TABLET ORAL
COMMUNITY
Start: 2020-08-11 | End: 2021-05-19

## 2020-10-09 NOTE — PROGRESS NOTES
HISTORY OF PRESENT ILLNESS  Willian Carnes is a 63 y.o. female who is present, aware, and consenting for real-time synchronous virtual video visit through [employer]. HPI  Sinus: Pt reports that she developed a rash on her chest and the neck on Monday. She notes that she is now experiencing chest tightness, ear pain,  and sinus congestion. She notes that she experienced a low grade fever for 1 day. Denies sore throat, persistent cough, body aches, chills, or loss of taste or smell. She notes that she went to a  service last Friday. Pt notes that she is scheduled for COVID testing on Monday. Hyperlipidemia:  Cardiovascular risk analysis - 63 y.o. female LDL goal is under 130. ROS: taking medications as instructed, no medication side effects noted, no TIA's, no chest pain on exertion, no dyspnea on exertion, no swelling of ankles. Tolerating meds, no myalgias or other side effects noted. New concerns: Pt's last LDL was 133 on 20. Review of Systems   HENT: Positive for congestion and ear pain. All other systems reviewed and are negative. Physical Exam  Vitals signs reviewed. Constitutional:       General: She is not in acute distress. Appearance: Normal appearance. She is not ill-appearing, toxic-appearing or diaphoretic. HENT:      Right Ear: Hearing normal.      Left Ear: Hearing normal.      Nose: Nose normal.      Mouth/Throat:      Mouth: Mucous membranes are moist.      Pharynx: Oropharynx is clear. Eyes:      Conjunctiva/sclera: Conjunctivae normal.   Neck:      Musculoskeletal: Normal range of motion. Pulmonary:      Effort: No respiratory distress. Breath sounds: Normal air entry. Musculoskeletal: Normal range of motion. Skin:     General: Skin is warm and dry. Neurological:      General: No focal deficit present. Mental Status: She is alert and oriented to person, place, and time. Mental status is at baseline.    Psychiatric:         Mood and Affect: Mood normal.         Behavior: Behavior normal.         Thought Content: Thought content normal.         Judgment: Judgment normal.         ASSESSMENT and PLAN  Diagnoses and all orders for this visit:    1. Acute non-recurrent maxillary sinusitis  Pt presents with sxs consistent with a sinus infection. Pt's sxs are improving. Discussed with pt that I want her to avoid contact with others until she gets COVID tested. Informed pt that if her test is positive, then she will need to reschedule her EGD. Will continue to monitor for improvements or changes. 2. Pure hypercholesterolemia  Stable, patient is tolerating medications, no myalgias. I do not recommend any change in medications. Lab results and schedule of future lab studies reviewed with patient. Reviewed diet, exercise and weight control. Written by Panda Watkins, as dictated by Dahiana Horowitz MD.     Current diagnosis and concerns discussed with pt at length. Understands risks and benefits or current treatment plan and medications and accepts the treatment and medication with any possible risks. Pt asks appropriate questions which were answered. Pt instructed to call with any concerns or problems.

## 2020-10-12 ENCOUNTER — TELEPHONE (OUTPATIENT)
Dept: INTERNAL MEDICINE CLINIC | Age: 60
End: 2020-10-12

## 2020-10-12 NOTE — LETTER
10/13/2020 8:56 AM 
 
Ms. Chauncey Ceballos [address] [place] [address] [phone] To whom it may concern: Ms. Leyla Alcocer received a COVID-19 test which is negative. She is cleared to proceed with her endoscopy. Sincerely, Khadijah Mary MD

## 2020-10-12 NOTE — TELEPHONE ENCOUNTER
----- Message from [place] sent at 10/12/2020  2:49 PM EDT -----  Regarding: Dr. Leigh Plush first and last name: Mel Murray  Reason for call: Pt is requesting a call back to get clearance/assistance on rescheduling her endoscopy.   Pt has received a negative rapid COVID test.  Callback required yes/no and why: yes  Best contact number(s): [phone]  Details to clarify the request: n/a

## 2021-01-03 DIAGNOSIS — E78.00 PURE HYPERCHOLESTEROLEMIA: ICD-10-CM

## 2021-01-03 RX ORDER — SIMVASTATIN 20 MG/1
TABLET, FILM COATED ORAL
Qty: 90 TAB | Refills: 1 | Status: SHIPPED | OUTPATIENT
Start: 2021-01-03 | End: 2021-07-02

## 2021-01-07 ENCOUNTER — TELEPHONE (OUTPATIENT)
Dept: INTERNAL MEDICINE CLINIC | Age: 61
End: 2021-01-07

## 2021-01-07 NOTE — TELEPHONE ENCOUNTER
Spoke with patient and provided appt 1/8/21 at 9:30am with Dr. Juliano Herrera. Pt understood and was thankful for the call.

## 2021-01-07 NOTE — TELEPHONE ENCOUNTER
----- Message from [employer] sent at 1/7/2021 11:49 AM EST -----  Regarding: Yg Boyd MD  Level 1/Escalated Issue      Caller's first and last name and relationship (if not the patient):      Best contact number(s): [phone]      What are the symptoms: discomfort when using restroom-lower back and legs       Transfer successful - yes/no (include outcome): N/A      Transfer declined - yes/no (include reason): N/A      Was caller advised to seek appropriate level of care - yes/no: Yes      Details to clarify the request: pt advised able to schedule for earlier date if available.          Ismael Weems

## 2021-01-08 ENCOUNTER — OFFICE VISIT (OUTPATIENT)
Dept: INTERNAL MEDICINE CLINIC | Age: 61
End: 2021-01-08
Payer: COMMERCIAL

## 2021-01-08 VITALS
RESPIRATION RATE: 20 BRPM | DIASTOLIC BLOOD PRESSURE: 61 MMHG | SYSTOLIC BLOOD PRESSURE: 94 MMHG | HEIGHT: 61 IN | HEART RATE: 96 BPM | OXYGEN SATURATION: 100 % | WEIGHT: 123.2 LBS | TEMPERATURE: 97.8 F | BODY MASS INDEX: 23.26 KG/M2

## 2021-01-08 DIAGNOSIS — R10.2 PELVIC PAIN: ICD-10-CM

## 2021-01-08 DIAGNOSIS — E78.00 PURE HYPERCHOLESTEROLEMIA: ICD-10-CM

## 2021-01-08 DIAGNOSIS — K21.9 GASTROESOPHAGEAL REFLUX DISEASE WITHOUT ESOPHAGITIS: ICD-10-CM

## 2021-01-08 DIAGNOSIS — M79.605 PAIN IN BOTH LOWER EXTREMITIES: Primary | ICD-10-CM

## 2021-01-08 DIAGNOSIS — M79.604 PAIN IN BOTH LOWER EXTREMITIES: Primary | ICD-10-CM

## 2021-01-08 PROCEDURE — 99214 OFFICE O/P EST MOD 30 MIN: CPT | Performed by: INTERNAL MEDICINE

## 2021-01-08 RX ORDER — OMEPRAZOLE 10 MG/1
40 CAPSULE, DELAYED RELEASE ORAL DAILY
COMMUNITY

## 2021-01-08 NOTE — PROGRESS NOTES
Jason Barrios (: 1960) is a 63 y.o. female, established patient, here for evaluation of the following chief complaint(s):  Follow-up (lower back and leg discomfort)       ASSESSMENT/PLAN:  1. Pain in both lower extremities  Not at goal with pain above baseline. Discussed with pt that her pain could be coming from hip, but it would be unusual for her to suddenly develop hip arthritis and she would not be able to walk. Explained that leg pain can come from the back and informed pt that one does not need to have low back pain to have pain originating in back. Discussed with pt that she could have developed sciatica. Informed pt that I can image her spine to r/o herniated spine but I would rather send her to PT for analysis to determine if pain coming from pelvic floor. Informed pt, that regardless of etiology, PT would be my first recommendation for treatment. Explained that I do not think imaging is necessary right now since she is still able to walk. Pt agrees to go to PT. Do not suspect blood clot because she does not have unilateral leg swelling and is able to walk. Ordering labs for further evaluation. Will continue to monitor for improvements or changes. -     CBC W/O DIFF; Future    2. Pure hypercholesterolemia  Presumed stable, will recheck labs today. Patient is tolerating medications with no myalgias. I do not recommend any change in treatment plan. -     LIPID PANEL; Future  -     METABOLIC PANEL, COMPREHENSIVE; Future    3. Gastroesophageal reflux disease without esophagitis  Stable and well-managed. No change in medications. 4. Pelvic pain  Not at goal with pain above baseline. Discussed with pt that her pain could be coming from hip, but it would be unusual for her to suddenly develop hip arthritis and she would not be able to walk. Explained that leg pain can come from the back and informed pt that one does not need to have low back pain to have pain originating in back.  Discussed with pt that she could have developed sciatica. Informed pt that I can image her spine to r/o herniated spine but I would rather send her to PT for analysis to determine if pain coming from pelvic floor. Informed pt, that regardless of etiology, PT would be my first recommendation for treatment. Explained that I do not think imaging is necessary right now since she is still able to walk. Pt agrees to go to PT. Do not suspect blood clot because she does not have unilateral leg swelling and is able to walk. Ordering labs for further evaluation. Will continue to monitor for improvements or changes. -     URINALYSIS W/ REFLEX CULTURE; Future      SUBJECTIVE/OBJECTIVE:  HPI  Pt c/o R upper leg pain above baseline starting approx 2 weeks ago. She notes that she does have upper leg pain R>L at baseline due to pelvic floor. She states that the pain started in her R glute with numbness and tingling which lasted for 1 week and then subsided. Pt reports that sitting and standing causes her inner upper thigh to catch and cause pain. She notes that her pain has been radiating down to her calves which is unusual for her. She states that she has been continuing to walk and comply with Gabapentin. She notes that the last time she did PT was 2 years ago. Pt reports that she is not taking anything to manage pain. Review of Systems    Physical Exam  Constitutional:       Appearance: Normal appearance. HENT:      Right Ear: Tympanic membrane and external ear normal.      Left Ear: Tympanic membrane and external ear normal.      Mouth/Throat:      Mouth: Mucous membranes are moist.      Pharynx: Oropharynx is clear. Cardiovascular:      Rate and Rhythm: Normal rate and regular rhythm. Pulses: Normal pulses. Heart sounds: Normal heart sounds. Pulmonary:      Effort: Pulmonary effort is normal.      Breath sounds: Normal breath sounds. Musculoskeletal: Normal range of motion.       Comments: Tightness with external rotation of R hip   Skin:     General: Skin is warm and dry. Neurological:      General: No focal deficit present. Mental Status: She is alert and oriented to person, place, and time. Psychiatric:         Mood and Affect: Mood normal.         Behavior: Behavior normal.               An electronic signature was used to authenticate this note.   -- Sudhir Winter

## 2021-01-09 LAB
ALBUMIN SERPL-MCNC: 4.7 G/DL (ref 3.8–4.8)
ALBUMIN/GLOB SERPL: 1.9 {RATIO} (ref 1.2–2.2)
ALP SERPL-CCNC: 95 IU/L (ref 39–117)
ALT SERPL-CCNC: 9 IU/L (ref 0–32)
APPEARANCE UR: CLEAR
AST SERPL-CCNC: 22 IU/L (ref 0–40)
BACTERIA #/AREA URNS HPF: NORMAL /[HPF]
BILIRUB SERPL-MCNC: 0.5 MG/DL (ref 0–1.2)
BILIRUB UR QL STRIP: NEGATIVE
BUN SERPL-MCNC: 22 MG/DL (ref 8–27)
BUN/CREAT SERPL: 23 (ref 12–28)
CALCIUM SERPL-MCNC: 9.9 MG/DL (ref 8.7–10.3)
CASTS URNS QL MICRO: NORMAL /LPF
CHLORIDE SERPL-SCNC: 100 MMOL/L (ref 96–106)
CHOLEST SERPL-MCNC: 216 MG/DL (ref 100–199)
CO2 SERPL-SCNC: 23 MMOL/L (ref 20–29)
COLOR UR: YELLOW
CREAT SERPL-MCNC: 0.95 MG/DL (ref 0.57–1)
EPI CELLS #/AREA URNS HPF: NORMAL /HPF (ref 0–10)
ERYTHROCYTE [DISTWIDTH] IN BLOOD BY AUTOMATED COUNT: 11.4 % (ref 11.7–15.4)
GLOBULIN SER CALC-MCNC: 2.5 G/DL (ref 1.5–4.5)
GLUCOSE SERPL-MCNC: 70 MG/DL (ref 65–99)
GLUCOSE UR QL: NEGATIVE
HCT VFR BLD AUTO: 44.1 % (ref 34–46.6)
HDLC SERPL-MCNC: 64 MG/DL
HGB BLD-MCNC: 15.1 G/DL (ref 11.1–15.9)
HGB UR QL STRIP: NEGATIVE
INTERPRETATION, 910389: NORMAL
KETONES UR QL STRIP: NEGATIVE
LDLC SERPL CALC-MCNC: 133 MG/DL (ref 0–99)
LEUKOCYTE ESTERASE UR QL STRIP: NEGATIVE
MCH RBC QN AUTO: 32.1 PG (ref 26.6–33)
MCHC RBC AUTO-ENTMCNC: 34.2 G/DL (ref 31.5–35.7)
MCV RBC AUTO: 94 FL (ref 79–97)
MICRO URNS: NORMAL
MICRO URNS: NORMAL
MUCOUS THREADS URNS QL MICRO: PRESENT
NITRITE UR QL STRIP: NEGATIVE
PH UR STRIP: 6.5 [PH] (ref 5–7.5)
PLATELET # BLD AUTO: 270 X10E3/UL (ref 150–450)
POTASSIUM SERPL-SCNC: 4.3 MMOL/L (ref 3.5–5.2)
PROT SERPL-MCNC: 7.2 G/DL (ref 6–8.5)
PROT UR QL STRIP: NEGATIVE
RBC # BLD AUTO: 4.7 X10E6/UL (ref 3.77–5.28)
RBC #/AREA URNS HPF: NORMAL /HPF (ref 0–2)
SODIUM SERPL-SCNC: 139 MMOL/L (ref 134–144)
SP GR UR: 1.01 (ref 1–1.03)
TRIGL SERPL-MCNC: 110 MG/DL (ref 0–149)
URINALYSIS REFLEX, 377202: NORMAL
UROBILINOGEN UR STRIP-MCNC: 0.2 MG/DL (ref 0.2–1)
VLDLC SERPL CALC-MCNC: 19 MG/DL (ref 5–40)
WBC # BLD AUTO: 5.5 X10E3/UL (ref 3.4–10.8)
WBC #/AREA URNS HPF: NORMAL /HPF (ref 0–5)

## 2021-05-19 ENCOUNTER — OFFICE VISIT (OUTPATIENT)
Dept: INTERNAL MEDICINE CLINIC | Age: 61
End: 2021-05-19
Payer: COMMERCIAL

## 2021-05-19 VITALS
HEART RATE: 76 BPM | SYSTOLIC BLOOD PRESSURE: 93 MMHG | WEIGHT: 124.6 LBS | HEIGHT: 61 IN | TEMPERATURE: 97.8 F | BODY MASS INDEX: 23.53 KG/M2 | RESPIRATION RATE: 18 BRPM | DIASTOLIC BLOOD PRESSURE: 56 MMHG | OXYGEN SATURATION: 98 %

## 2021-05-19 DIAGNOSIS — M25.542 ARTHRALGIA OF BOTH HANDS: ICD-10-CM

## 2021-05-19 DIAGNOSIS — K21.9 GASTROESOPHAGEAL REFLUX DISEASE WITHOUT ESOPHAGITIS: ICD-10-CM

## 2021-05-19 DIAGNOSIS — M79.605 PAIN IN BOTH LOWER EXTREMITIES: ICD-10-CM

## 2021-05-19 DIAGNOSIS — E78.00 PURE HYPERCHOLESTEROLEMIA: Primary | ICD-10-CM

## 2021-05-19 DIAGNOSIS — M79.604 PAIN IN BOTH LOWER EXTREMITIES: ICD-10-CM

## 2021-05-19 DIAGNOSIS — M25.541 ARTHRALGIA OF BOTH HANDS: ICD-10-CM

## 2021-05-19 PROCEDURE — 99214 OFFICE O/P EST MOD 30 MIN: CPT | Performed by: INTERNAL MEDICINE

## 2021-05-19 NOTE — PROGRESS NOTES
Koki Knott (: 1960) is a 57 y.o. female, established patient, here for evaluation of the following chief complaint(s):  Skin Problem (lump on L hand and starting on R)       ASSESSMENT/PLAN:  Below is the assessment and plan developed based on review of pertinent history, physical exam, labs, studies, and medications. 1. Pure hypercholesterolemia  Stable, patient is tolerating medications, no myalgias. I do not recommend any change in medications (Zocor). 2. Arthralgia of both hands  -     REFERRAL TO ORTHOPEDICS  I referred her to an orthopedist (hand specialist)   3. Gastroesophageal reflux disease without esophagitis  Stable and well-managed. Continue with ongoing regimen of Omeprazole. 4. Pain in both lower extremities  Stable and well-managed. Continue with ongoing regimen of Gabapentin. No follow-ups on file. SUBJECTIVE/OBJECTIVE:  HPI     Cystic growth: Pt notes a cystic growth on her L thumb joint and also on her right middle finger. It is tender, especially on the L side, but not acutely painful. Hyperlipidemia:  Cardiovascular risk analysis - 57 y.o. female LDL goal is under 130. ROS: taking medications as instructed, no medication side effects noted, no TIA's, no chest pain on exertion, no dyspnea on exertion, no swelling of ankles. Tolerating meds, no myalgias or other side effects noted. New concerns: Pt's last LDL was 133 on 21. Neuropathy:  Pt reports doing well, noting that the Gabapentin is working and [employer] the edge off\" but that she would like to be off it if she could. Pt notes that her leg is in constant pain and is concerned about clot if she were to get COVID. GERD:   Pt reports she discontinued Sucralfate because GI doctor recommended it, but is doing well with the Omeprazole. Health maintenance: Pt reports she has fatty tissue in the L breast that has always been present. She had a mammogram on Monday where this was revealed.  She went back to [place] but eventually the mass was found to be benign. Pt is followed by Dr. Mitesh Matthews.    Other: Pt has not yet received COVID-19 vaccine and is unsure about it due to concerns about neuropathy. I reassured her that those with neuropathy in trials have been okay and discussed long-haul symptoms of COVID. I recommended the J&J vaccine. Review of Systems   Musculoskeletal: Positive for arthralgias (L thumb and R middle finger), joint swelling (L thumb and R middle finger) and myalgias (Lower leg pain). All other systems reviewed and are negative. Physical Exam  Constitutional:       Appearance: Normal appearance. HENT:      Right Ear: Tympanic membrane and external ear normal.      Left Ear: Tympanic membrane and external ear normal.      Mouth/Throat:      Mouth: Mucous membranes are moist.      Pharynx: Oropharynx is clear. Cardiovascular:      Rate and Rhythm: Normal rate and regular rhythm. Pulses: Normal pulses. Heart sounds: Normal heart sounds. Pulmonary:      Effort: Pulmonary effort is normal.      Breath sounds: Normal breath sounds. Musculoskeletal:         General: Normal range of motion. Skin:     General: Skin is warm and dry. Findings: Lesion (1 mm cystic lesion on R and L knuckles, caused pain upon palpitation, no redness or warmth) present. Neurological:      General: No focal deficit present. Mental Status: She is alert and oriented to person, place, and time. Psychiatric:         Mood and Affect: Mood normal.         Behavior: Behavior normal.           On this date 05/19/2021 I have spent 30 minutes reviewing previous notes, test results and face to face with the patient discussing the diagnosis and importance of compliance with the treatment plan as well as documenting on the day of the visit. An electronic signature was used to authenticate this note.   Written by Ivon Barney, as dictated by Dr. Vishal Sexton MD.  -- Renetta Lima

## 2021-07-02 DIAGNOSIS — E78.00 PURE HYPERCHOLESTEROLEMIA: ICD-10-CM

## 2021-07-02 RX ORDER — SIMVASTATIN 20 MG/1
TABLET, FILM COATED ORAL
Qty: 90 TABLET | Refills: 1 | Status: SHIPPED | OUTPATIENT
Start: 2021-07-02 | End: 2022-01-05

## 2022-01-05 DIAGNOSIS — E78.00 PURE HYPERCHOLESTEROLEMIA: ICD-10-CM

## 2022-01-05 RX ORDER — SIMVASTATIN 20 MG/1
TABLET, FILM COATED ORAL
Qty: 90 TABLET | Refills: 1 | Status: SHIPPED | OUTPATIENT
Start: 2022-01-05 | End: 2022-07-01

## 2022-07-23 DIAGNOSIS — E78.00 PURE HYPERCHOLESTEROLEMIA: ICD-10-CM

## 2022-07-23 RX ORDER — SIMVASTATIN 20 MG/1
TABLET, FILM COATED ORAL
Qty: 30 TABLET | Refills: 0 | OUTPATIENT
Start: 2022-07-23

## 2022-08-01 ENCOUNTER — TELEPHONE (OUTPATIENT)
Dept: INTERNAL MEDICINE CLINIC | Age: 62
End: 2022-08-01

## 2022-08-01 DIAGNOSIS — Z00.00 ROUTINE GENERAL MEDICAL EXAMINATION AT A HEALTH CARE FACILITY: ICD-10-CM

## 2022-08-01 DIAGNOSIS — E78.5 HYPERLIPIDEMIA LDL GOAL <100: Primary | ICD-10-CM

## 2022-08-01 NOTE — TELEPHONE ENCOUNTER
Spoke with patient and faxed lab slip to the [facility] in the [facility]. Pt understood and was thankful for the call.

## 2022-08-01 NOTE — TELEPHONE ENCOUNTER
----- Message from Duane Carton sent at 7/29/2022 12:02 PM EDT -----  Subject: Message to Provider    QUESTIONS  Information for Provider? Patient wants to know if she will need to have   blood drawn at her next appointment? Please advise.  ---------------------------------------------------------------------------  --------------  Angus Espana [facility]  [phone]; OK to leave message on voicemail  ---------------------------------------------------------------------------  --------------  SCRIPT ANSWERS  Relationship to Patient?  Self

## 2022-08-03 NOTE — PROGRESS NOTES
Zakia Emmanuel (: 1960) is a 60 y.o. female, established patient, here for evaluation of the following chief complaint(s):  Follow-up (cholesterol)       ASSESSMENT/PLAN:  Below is the assessment and plan developed based on review of pertinent history, physical exam, labs, studies, and medications. 1. Hyperlipidemia LDL goal <100  -     simvastatin (ZOCOR) 20 mg tablet; Take 1 Tablet by mouth nightly., Normal, Disp-90 Tablet, R-3Pt needs follow up appt  2. Gastroesophageal reflux disease without esophagitis- she is taking prilosec and tolerating medicine   3. Pain in both lower extremities- her neuropathy is stable and she takes the gabapentin two times a day and helps with     Right shoulder pain - intermittently -when lifts can feel it - a little tender; will try nsiad, voltaren gel and if not better call for orthopedic       SUBJECTIVE/OBJECTIVE:  HPI    Subjective:   Zakia Emmanuel is a 60 y.o. female with hyperlipidemia. Cardiovascular risk analysis - 60 y.o. female LDL goal is under 100. ROS: taking medications as instructed, no medication side effects noted, no TIA's, no chest pain on exertion, no dyspnea on exertion, no swelling of ankles. Tolerating meds, no myalgias or other side effects noted  New concerns: tolerating zocor and doing well . Neuropathy:  Pt reports doing well, noting that the Gabapentin is working and [employer] the edge off\" but that she would like to be off it if she could. GERD:   She is doing well with the Omeprazole. And symptoms are stable     Kids have been coming to her house   Review of Systems   All other systems reviewed and are negative. Physical Exam  Vitals and nursing note reviewed. Constitutional:       Appearance: She is well-developed. HENT:      Head: Normocephalic and atraumatic. Right Ear: External ear normal.      Left Ear: External ear normal.      Nose: Nose normal.   Neck:      Thyroid: No thyroid mass or thyromegaly.       Vascular: No carotid bruit or JVD. Cardiovascular:      Rate and Rhythm: Normal rate and regular rhythm. Pulses: Normal pulses. Heart sounds: Normal heart sounds, S1 normal and S2 normal. No murmur heard. No friction rub. No gallop. Pulmonary:      Effort: Pulmonary effort is normal.      Breath sounds: Normal breath sounds. Abdominal:      General: Bowel sounds are normal.      Palpations: Abdomen is soft. Musculoskeletal:         General: Normal range of motion. Cervical back: Normal range of motion and neck supple. Skin:     General: Skin is warm and dry. Neurological:      Mental Status: She is alert and oriented to person, place, and time. Psychiatric:         Behavior: Behavior normal.         Thought Content: Thought content normal.         Judgment: Judgment normal.       On this date 08/04/2022 I have spent 35 minutes reviewing previous notes, test results and face to face with the patient discussing the diagnosis and importance of compliance with the treatment plan as well as documenting on the day of the visit. An electronic signature was used to authenticate this note.   -- Harriet Reagan MD

## 2022-08-04 ENCOUNTER — OFFICE VISIT (OUTPATIENT)
Dept: INTERNAL MEDICINE CLINIC | Age: 62
End: 2022-08-04
Payer: COMMERCIAL

## 2022-08-04 VITALS
SYSTOLIC BLOOD PRESSURE: 104 MMHG | OXYGEN SATURATION: 98 % | HEART RATE: 74 BPM | WEIGHT: 125.4 LBS | HEIGHT: 61 IN | TEMPERATURE: 98.5 F | DIASTOLIC BLOOD PRESSURE: 65 MMHG | RESPIRATION RATE: 16 BRPM | BODY MASS INDEX: 23.68 KG/M2

## 2022-08-04 DIAGNOSIS — M79.604 PAIN IN BOTH LOWER EXTREMITIES: ICD-10-CM

## 2022-08-04 DIAGNOSIS — M79.605 PAIN IN BOTH LOWER EXTREMITIES: ICD-10-CM

## 2022-08-04 DIAGNOSIS — K21.9 GASTROESOPHAGEAL REFLUX DISEASE WITHOUT ESOPHAGITIS: ICD-10-CM

## 2022-08-04 DIAGNOSIS — E78.5 HYPERLIPIDEMIA LDL GOAL <100: Primary | ICD-10-CM

## 2022-08-04 LAB
ALBUMIN SERPL-MCNC: 4.7 G/DL (ref 3.8–4.8)
ALBUMIN/GLOB SERPL: 2.1 {RATIO} (ref 1.2–2.2)
ALP SERPL-CCNC: 95 IU/L (ref 44–121)
ALT SERPL-CCNC: 7 IU/L (ref 0–32)
APPEARANCE UR: CLEAR
AST SERPL-CCNC: 22 IU/L (ref 0–40)
BILIRUB SERPL-MCNC: 0.3 MG/DL (ref 0–1.2)
BILIRUB UR QL STRIP: NEGATIVE
BUN SERPL-MCNC: 19 MG/DL (ref 8–27)
BUN/CREAT SERPL: 19 (ref 12–28)
CALCIUM SERPL-MCNC: 9.4 MG/DL (ref 8.7–10.3)
CHLORIDE SERPL-SCNC: 101 MMOL/L (ref 96–106)
CHOLEST SERPL-MCNC: 190 MG/DL (ref 100–199)
CO2 SERPL-SCNC: 24 MMOL/L (ref 20–29)
COLOR UR: YELLOW
CREAT SERPL-MCNC: 0.99 MG/DL (ref 0.57–1)
EGFR: 64 ML/MIN/1.73
ERYTHROCYTE [DISTWIDTH] IN BLOOD BY AUTOMATED COUNT: 12.1 % (ref 11.7–15.4)
GLOBULIN SER CALC-MCNC: 2.2 G/DL (ref 1.5–4.5)
GLUCOSE SERPL-MCNC: 85 MG/DL (ref 65–99)
GLUCOSE UR QL STRIP: NEGATIVE
HCT VFR BLD AUTO: 43.3 % (ref 34–46.6)
HDLC SERPL-MCNC: 63 MG/DL
HGB BLD-MCNC: 14.3 G/DL (ref 11.1–15.9)
HGB UR QL STRIP: NEGATIVE
IMP & REVIEW OF LAB RESULTS: NORMAL
KETONES UR QL STRIP: NEGATIVE
LDLC SERPL CALC-MCNC: 111 MG/DL (ref 0–99)
LEUKOCYTE ESTERASE UR QL STRIP: NEGATIVE
MCH RBC QN AUTO: 31.4 PG (ref 26.6–33)
MCHC RBC AUTO-ENTMCNC: 33 G/DL (ref 31.5–35.7)
MCV RBC AUTO: 95 FL (ref 79–97)
MICRO URNS: NORMAL
NITRITE UR QL STRIP: NEGATIVE
PH UR STRIP: 6.5 [PH] (ref 5–7.5)
PLATELET # BLD AUTO: 281 X10E3/UL (ref 150–450)
POTASSIUM SERPL-SCNC: 4.3 MMOL/L (ref 3.5–5.2)
PROT SERPL-MCNC: 6.9 G/DL (ref 6–8.5)
PROT UR QL STRIP: NEGATIVE
RBC # BLD AUTO: 4.55 X10E6/UL (ref 3.77–5.28)
SODIUM SERPL-SCNC: 140 MMOL/L (ref 134–144)
SP GR UR STRIP: 1.01 (ref 1–1.03)
TRIGL SERPL-MCNC: 87 MG/DL (ref 0–149)
TSH SERPL DL<=0.005 MIU/L-ACNC: 2.89 UIU/ML (ref 0.45–4.5)
UROBILINOGEN UR STRIP-MCNC: 0.2 MG/DL (ref 0.2–1)
VLDLC SERPL CALC-MCNC: 16 MG/DL (ref 5–40)
WBC # BLD AUTO: 5.2 X10E3/UL (ref 3.4–10.8)

## 2022-08-04 PROCEDURE — 99214 OFFICE O/P EST MOD 30 MIN: CPT | Performed by: INTERNAL MEDICINE

## 2022-08-04 RX ORDER — SIMVASTATIN 20 MG/1
20 TABLET, FILM COATED ORAL
Qty: 90 TABLET | Refills: 3 | Status: SHIPPED | OUTPATIENT
Start: 2022-08-04

## 2022-11-04 ENCOUNTER — OFFICE VISIT (OUTPATIENT)
Dept: INTERNAL MEDICINE CLINIC | Age: 62
End: 2022-11-04
Payer: COMMERCIAL

## 2022-11-04 VITALS
DIASTOLIC BLOOD PRESSURE: 67 MMHG | SYSTOLIC BLOOD PRESSURE: 104 MMHG | RESPIRATION RATE: 16 BRPM | HEART RATE: 96 BPM | BODY MASS INDEX: 23.56 KG/M2 | TEMPERATURE: 98.8 F | HEIGHT: 61 IN | OXYGEN SATURATION: 97 % | WEIGHT: 124.8 LBS

## 2022-11-04 DIAGNOSIS — M25.561 POSTERIOR KNEE PAIN, RIGHT: Primary | ICD-10-CM

## 2022-11-04 DIAGNOSIS — Z23 NEEDS FLU SHOT: ICD-10-CM

## 2022-11-04 PROCEDURE — 99213 OFFICE O/P EST LOW 20 MIN: CPT | Performed by: INTERNAL MEDICINE

## 2022-11-04 PROCEDURE — 90471 IMMUNIZATION ADMIN: CPT | Performed by: INTERNAL MEDICINE

## 2022-11-04 PROCEDURE — 90686 IIV4 VACC NO PRSV 0.5 ML IM: CPT | Performed by: INTERNAL MEDICINE

## 2022-11-04 NOTE — PATIENT INSTRUCTIONS
Vaccine Information Statement    Influenza (Flu) Vaccine (Inactivated or Recombinant): What You Need to Know    Many vaccine information statements are available in Pashto and other languages. See www.immunize.org/vis. Hojas de información sobre vacunas están disponibles en español y en muchos otros idiomas. Visite www.immunize.org/vis. 1. Why get vaccinated? Influenza vaccine can prevent influenza (flu). Flu is a contagious disease that spreads around the [facility] every year, usually between October and May. Anyone can get the flu, but it is more dangerous for some people. Infants and young children, people 72 years and older, pregnant people, and people with certain health conditions or a weakened immune system are at greatest risk of flu complications. Pneumonia, bronchitis, sinus infections, and ear infections are examples of flu-related complications. If you have a medical condition, such as heart disease, cancer, or diabetes, flu can make it worse. Flu can cause fever and chills, sore throat, muscle aches, fatigue, cough, headache, and runny or stuffy nose. Some people may have vomiting and diarrhea, though this is more common in children than adults. In an average year, thousands of people in the [facility] die from flu, and many more are hospitalized. Flu vaccine prevents millions of illnesses and flu-related visits to the doctor each year. 2. Influenza vaccines     CDC recommends everyone 6 months and older get vaccinated every flu season. Children 6 months through 6years of age may need 2 doses during a single flu season. Everyone else needs only 1 dose each flu season. It takes about 2 weeks for protection to develop after vaccination. There are many flu viruses, and they are always changing. Each year a new flu vaccine is made to protect against the influenza viruses believed to be likely to cause disease in the upcoming flu season.  Even when the vaccine doesnt exactly match these viruses, it may still provide some protection. Influenza vaccine does not cause flu. Influenza vaccine may be given at the same time as other vaccines. 3. Talk with your health care provider    Tell your vaccination provider if the person getting the vaccine:  Has had an allergic reaction after a previous dose of influenza vaccine, or has any severe, life-threatening allergies   Has ever had Guillain-Barré Syndrome (also called GBS)    In some cases, your health care provider may decide to postpone influenza vaccination until a future visit. Influenza vaccine can be administered at any time during pregnancy. People who are or will be pregnant during influenza season should receive inactivated influenza vaccine. People with minor illnesses, such as a cold, may be vaccinated. People who are moderately or severely ill should usually wait until they recover before getting influenza vaccine. Your health care provider can give you more information. 4. Risks of a vaccine reaction    Soreness, redness, and swelling where the shot is given, fever, muscle aches, and headache can happen after influenza vaccination. There may be a very small increased risk of Guillain-Barré Syndrome (GBS) after inactivated influenza vaccine (the flu shot). [facility] children who get the flu shot along with pneumococcal vaccine (PCV13) and/or DTaP vaccine at the same time might be slightly more likely to have a seizure caused by fever. Tell your health care provider if a child who is getting flu vaccine has ever had a seizure. People sometimes faint after medical procedures, including vaccination. Tell your provider if you feel dizzy or have vision changes or ringing in the ears. As with any medicine, there is a very remote chance of a vaccine causing a severe allergic reaction, other serious injury, or death. 5. What if there is a serious problem?     An allergic reaction could occur after the vaccinated person leaves the clinic. If you see signs of a severe allergic reaction (hives, swelling of the face and throat, difficulty breathing, a fast heartbeat, dizziness, or weakness), call 9-1-1 and get the person to the nearest hospital.    For other signs that concern you, call your health care provider. Adverse reactions should be reported to the Vaccine Adverse Event Reporting System (VAERS). Your health care provider will usually file this report, or you can do it yourself. Visit the VAERS website at www.vaers. [facility].gov or call 5-139.509.9306. VAERS is only for reporting reactions, and VAERS staff members do not give medical advice. 6. The National Vaccine Injury Compensation Program    The [facility] Vaccine Injury Compensation Program (VICP) is a federal program that was created to compensate people who may have been injured by certain vaccines. Claims regarding alleged injury or death due to vaccination have a time limit for filing, which may be as short as two years. Visit the VICP website at www.[facility].gov/vaccinecompensation or call 0-910.244.4785 to learn about the program and about filing a claim. 7. How can I learn more? Ask your health care provider. Call your local or state health department. Visit the website of the Food and Drug Administration (FDA) for vaccine package inserts and additional information at www.fda.gov/vaccines-blood-biologics/vaccines. Contact the Centers for Disease Control and Prevention (CDC): Call 9-342.377.5880 (1-800-CDC-INFO) or  Visit CDCs influenza website at www.cdc.gov/flu. Vaccine Information Statement   Inactivated Influenza Vaccine   8/6/2021  42 JULIANO Case Flavin [id number]   Department of Health and Human Services  Centers for Disease Control and Prevention    Office Use Only

## 2022-11-06 NOTE — PROGRESS NOTES
HISTORY OF PRESENT ILLNESS    Chief Complaint   Patient presents with    Joint Pain     Acute      Knee Pain     Right - behind the knee - feels swollen - since Monday 10/31/22. Presents with right posterior knee pain for 1 week. She is active, but denies any injuries. No recent travel more than 1 hour. No previous history of pain in this region or history of DVT. States that she feels a region of swelling there. It is uncomfortable, but not significantly painful. No other swelling of her calf or leg. Symptoms have been improving. Is taking Tylenol as needed. She is able to bend the knee completely without a problem. No warmth. Review of Systems   All other systems reviewed and are negative, except as noted in HPI    Past Medical and Surgical History   has a past medical history of AR (allergic rhinitis), Cancer ([facility] [place] [address] (00), Cholesterol serum increased, Diverticulosis, sigmoid, Hair loss (2011), Migraines, Neuropathy, FCO (obstructive sleep apnea), Other B-complex deficiencies, and Pelvic floor dysfunction. has a past surgical history that includes endoscopy, colon, diagnostic (04; 09); hx hysterectomy (85); hx salpingo-oophorectomy (00); and hx bladder repair (12/08/2017). reports that she has never smoked. She has never used smokeless tobacco. She reports current alcohol use. She reports that she does not use drugs. family history includes Arthritis-rheumatoid in her sister; Cancer in her mother; Depression in her sister; Elevated Lipids in her mother and sister; Emphysema in her sister; Heart Disease in her father and sister; High Cholesterol in her sister and sister; Hypertension in her sister; Other in her sister; Seizures in her mother; Stroke (age of onset: 46) in her sister. Physical Exam   Nursing note and vitals reviewed. Blood pressure 104/67, pulse 96, temperature 98.8 °F (37.1 °C), temperature source Oral, resp.  rate 16, height 5' 1\" (1.549 m), weight 124 lb 12.8 oz (56.6 kg), SpO2 97 %. Constitutional: In no distress. Eyes: Conjunctivae are normal.  HEENT:  No LAD or thyromegaly   Cardiovascular: Normal rate. regular rhythm. No murmurs  No edema  Pulmonary/Chest: Effort normal. clear to ausculation blaterally  Musculoskeletal:  no edema. Right posterior knee with perhaps mild fullness, no palpable cords. No edema. No warmth. Abd:  Neurological: Alert and oriented. Grossly intact cranial nerves and motor function. Skin: No visible rash noted. Psychiatric: Normal mood and affect. Behavior is normal.     ASSESSMENT and PLAN  1. Posterior knee pain, right  Posterior knee sprain versus Baker's cyst.  Overall is improving. Reassured. No evidence of thrombosis on exam.  Monitor for increasing swelling, edema of calf, edema of foot and ankle. Could consider additional imaging or referral to orthopedics if persists. 2. Needs flu shot  -     INFLUENZA, FLUARIX, FLULAVAL, FLUZONE (AGE 6 MO+), AFLURIA(AGE 3Y+) IM, PF, 0.5 ML        lab results and schedule of future lab studies reviewed with patient  reviewed medications and side effects in detail    Return to clinic for further evaluation if new symptoms develop or if current symptoms worsen or fail to resolve. Patient Instructions   Vaccine Information Statement    Influenza (Flu) Vaccine (Inactivated or Recombinant): What You Need to Know    Many vaccine information statements are available in Estonian and other languages. See www.immunize.org/vis. Hojas de información sobre vacunas están disponibles en español y en muchos otros idiomas. Visite www.immunize.org/vis. 1. Why get vaccinated? Influenza vaccine can prevent influenza (flu). Flu is a contagious disease that spreads around the [place] every year, usually between October and May. Anyone can get the flu, but it is more dangerous for some people.  Infants and young children, people 72 years and older, pregnant people, and people with certain health conditions or a weakened immune system are at greatest risk of flu complications. Pneumonia, bronchitis, sinus infections, and ear infections are examples of flu-related complications. If you have a medical condition, such as heart disease, cancer, or diabetes, flu can make it worse. Flu can cause fever and chills, sore throat, muscle aches, fatigue, cough, headache, and runny or stuffy nose. Some people may have vomiting and diarrhea, though this is more common in children than adults. In an average year, thousands of people in the [facility] die from flu, and many more are hospitalized. Flu vaccine prevents millions of illnesses and flu-related visits to the doctor each year. 2. Influenza vaccines     CDC recommends everyone 6 months and older get vaccinated every flu season. Children 6 months through 6years of age may need 2 doses during a single flu season. Everyone else needs only 1 dose each flu season. It takes about 2 weeks for protection to develop after vaccination. There are many flu viruses, and they are always changing. Each year a new flu vaccine is made to protect against the influenza viruses believed to be likely to cause disease in the upcoming flu season. Even when the vaccine doesnt exactly match these viruses, it may still provide some protection. Influenza vaccine does not cause flu. Influenza vaccine may be given at the same time as other vaccines. 3. Talk with your health care provider    Tell your vaccination provider if the person getting the vaccine:  Has had an allergic reaction after a previous dose of influenza vaccine, or has any severe, life-threatening allergies   Has ever had Guillain-Barré Syndrome (also called GBS)    In some cases, your health care provider may decide to postpone influenza vaccination until a future visit. Influenza vaccine can be administered at any time during pregnancy.  People who are or will be pregnant during influenza season should receive inactivated influenza vaccine. People with minor illnesses, such as a cold, may be vaccinated. People who are moderately or severely ill should usually wait until they recover before getting influenza vaccine. Your health care provider can give you more information. 4. Risks of a vaccine reaction    Soreness, redness, and swelling where the shot is given, fever, muscle aches, and headache can happen after influenza vaccination. There may be a very small increased risk of Guillain-Barré Syndrome (GBS) after inactivated influenza vaccine (the flu shot). [facility] children who get the flu shot along with pneumococcal vaccine (PCV13) and/or DTaP vaccine at the same time might be slightly more likely to have a seizure caused by fever. Tell your health care provider if a child who is getting flu vaccine has ever had a seizure. People sometimes faint after medical procedures, including vaccination. Tell your provider if you feel dizzy or have vision changes or ringing in the ears. As with any medicine, there is a very remote chance of a vaccine causing a severe allergic reaction, other serious injury, or death. 5. What if there is a serious problem? An allergic reaction could occur after the vaccinated person leaves the clinic. If you see signs of a severe allergic reaction (hives, swelling of the face and throat, difficulty breathing, a fast heartbeat, dizziness, or weakness), call 9-1-1 and get the person to the nearest hospital.    For other signs that concern you, call your health care provider. Adverse reactions should be reported to the Vaccine Adverse Event Reporting System (VAERS). Your health care provider will usually file this report, or you can do it yourself. Visit the VAERS website at www.vaers. [facility].gov or call 3-985.990.9254. VAERS is only for reporting reactions, and VAERS staff members do not give medical advice.     6. The National Vaccine Injury Compensation Program    The [employer] Injury Compensation Program (VICP) is a federal program that was created to compensate people who may have been injured by certain vaccines. Claims regarding alleged injury or death due to vaccination have a time limit for filing, which may be as short as two years. Visit the VICP website at www.[facility].gov/vaccinecompensation or call 6-810.332.7143 to learn about the program and about filing a claim. 7. How can I learn more? Ask your health care provider. Call your local or state health department. Visit the website of the Food and Drug Administration (FDA) for vaccine package inserts and additional information at www.fda.gov/vaccines-blood-biologics/vaccines. Contact the Centers for Disease Control and Prevention (CDC): Call 3-482.422.2260 (1-800-CDC-INFO) or  Visit CDCs influenza website at www.cdc.gov/flu. Vaccine Information Statement   Inactivated Influenza Vaccine   8/6/2021  42 JULIANO Brooks [id number]     Department of Health and Human Services  Centers for Disease Control and Prevention    Office Use Only

## 2023-04-25 ENCOUNTER — OFFICE VISIT (OUTPATIENT)
Dept: INTERNAL MEDICINE CLINIC | Age: 63
End: 2023-04-25
Payer: COMMERCIAL

## 2023-04-25 VITALS
TEMPERATURE: 99.3 F | SYSTOLIC BLOOD PRESSURE: 124 MMHG | WEIGHT: 124.4 LBS | HEIGHT: 61 IN | HEART RATE: 83 BPM | DIASTOLIC BLOOD PRESSURE: 81 MMHG | RESPIRATION RATE: 16 BRPM | BODY MASS INDEX: 23.49 KG/M2 | OXYGEN SATURATION: 98 %

## 2023-04-25 DIAGNOSIS — M79.605 PAIN IN BOTH LOWER EXTREMITIES: ICD-10-CM

## 2023-04-25 DIAGNOSIS — K21.9 GASTROESOPHAGEAL REFLUX DISEASE WITHOUT ESOPHAGITIS: ICD-10-CM

## 2023-04-25 DIAGNOSIS — E78.5 HYPERLIPIDEMIA LDL GOAL <100: ICD-10-CM

## 2023-04-25 DIAGNOSIS — J01.00 ACUTE NON-RECURRENT MAXILLARY SINUSITIS: Primary | ICD-10-CM

## 2023-04-25 DIAGNOSIS — M79.604 PAIN IN BOTH LOWER EXTREMITIES: ICD-10-CM

## 2023-04-25 PROCEDURE — 99214 OFFICE O/P EST MOD 30 MIN: CPT | Performed by: INTERNAL MEDICINE

## 2023-04-25 RX ORDER — AZITHROMYCIN 250 MG/1
250 TABLET, FILM COATED ORAL SEE ADMIN INSTRUCTIONS
Qty: 6 TABLET | Refills: 0 | Status: SHIPPED | OUTPATIENT
Start: 2023-04-25 | End: 2023-04-30

## 2023-04-25 NOTE — PROGRESS NOTES
[facility]  Identified pt with two pt identifiers(name and ). Chief Complaint   Patient presents with    Cold Symptoms     Pt presenting today with headache, sinus pressure, chest congestion, cough, sore throat, fever and chills;symptoms started on Wednesday of last week. Tested for covid (-) Friday with home test. OTC medications did relieve some symptoms. 1. Have you been to the ER, urgent care clinic since your last visit? Hospitalized since your last visit? NO    2. Have you seen or consulted any other health care providers outside of the [address] [place] [address] since your last visit? Include any pap smears or colon screening. NO      Provider notified of reason for visit, vitals and flowsheets obtained on patients. Patient received paperwork for advance directive during previous visit but has not completed at this time     Reviewed record In preparation for visit, huddled with provider and have obtained necessary documentation      There are no preventive care reminders to display for this patient.     Wt Readings from Last 3 Encounters:   23 124 lb 6.4 oz (56.4 kg)   22 124 lb 12.8 oz (56.6 kg)   22 125 lb 6.4 oz (56.9 kg)     Temp Readings from Last 3 Encounters:   23 99.3 °F (37.4 °C) (Oral)   22 98.8 °F (37.1 °C) (Oral)   22 98.5 °F (36.9 °C) (Oral)     BP Readings from Last 3 Encounters:   23 124/81   22 104/67   22 104/65     Pulse Readings from Last 3 Encounters:   23 83   22 96   22 74     Vitals:    23 1117   BP: 124/81   Pulse: 83   Resp: 16   Temp: 99.3 °F (37.4 °C)   TempSrc: Oral   SpO2: 98%   Weight: 124 lb 6.4 oz (56.4 kg)   Height: 5' 1\" (1.549 m)   PainSc:   5   PainLoc: Head         Learning Assessment:  :     Learning Assessment 10/15/2014 2014 2012   PRIMARY LEARNER Patient Patient Patient   HIGHEST LEVEL OF EDUCATION - PRIMARY LEARNER  SOME COLLEGE - SOME COLLEGE   BARRIERS PRIMARY LEARNER NONE - NONE   CO-LEARNER CAREGIVER No - -   PRIMARY LANGUAGE ENGLISH ENGLISH ENGLISH   LEARNER PREFERENCE PRIMARY DEMONSTRATION DEMONSTRATION DEMONSTRATION   ANSWERED BY patient patient patient   RELATIONSHIP SELF SELF SELF       Depression Screening:  :     3 most recent PHQ Screens 4/25/2023   Little interest or pleasure in doing things Not at all   Feeling down, depressed, irritable, or hopeless Not at all   Total Score PHQ 2 0       Fall Risk Assessment:  :     Fall Risk Assessment, last 12 mths 11/4/2022   Able to walk? Yes   Fall in past 12 months? 0   Do you feel unsteady? 0   Are you worried about falling 0   Number of falls in past 12 months -   Fall with injury? -       Abuse Screening:  :     Abuse Screening Questionnaire 11/4/2022 4/6/2018   Do you ever feel afraid of your partner? N N   Are you in a relationship with someone who physically or mentally threatens you? N N   Is it safe for you to go home? Y Y       ADL Screening:  :     ADL Assessment 11/4/2022   Feeding yourself No Help Needed   Getting from bed to chair No Help Needed   Getting dressed No Help Needed   Bathing or showering No Help Needed   Walk across the room (includes cane/walker) No Help Needed   Using the telphone No Help Needed   Taking your medications No Help Needed   Preparing meals No Help Needed   Managing money (expenses/bills) No Help Needed   Moderately strenuous housework (laundry) No Help Needed   Shopping for personal items (toiletries/medicines) No Help Needed   Shopping for groceries No Help Needed   Driving No Help Needed   Climbing a flight of stairs No Help Needed   Getting to places beyond walking distances No Help Needed         Medication reconciliation up to date and corrected with patient at this time.

## 2023-04-25 NOTE — PROGRESS NOTES
Alaina Baca (: 1960) is a 58 y.o. female, established patient, here for evaluation of the following chief complaint(s):  Cold Symptoms (Pt presenting today with headache, sinus pressure, chest congestion, cough, sore throat, fever and chills;symptoms started on Wednesday of last week. Tested for covid (-) Friday with home test. OTC medications did relieve some symptoms. )       ASSESSMENT/PLAN:  Below is the assessment and plan developed based on review of pertinent history, physical exam, labs, studies, and medications. 1. Acute non-recurrent maxillary sinusitis  -     azithromycin (ZITHROMAX) 250 mg tablet; Take 1 Tablet by mouth See Admin Instructions for 5 days. , Normal, Disp-6 Tablet, R-0  2. Hyperlipidemia LDL goal <100-cont with zocor and tolerating medicine   3. Gastroesophageal reflux disease without esophagitis- cont with omeprazole OTC   4. Pain in both lower extremities-cont with gabapentin 100 bid so able to decrease dose     Simvastatin  Prilosec  Gabapentin  No follow-ups on file. SUBJECTIVE/OBJECTIVE:  HPI  She has cough and congestion that is going on now for a week- some sore throat and PND and cough- and has been a loose cough and nothign is ; coming up and hurts to cough - low grade temp and headache - ; facial pain and hurting in cheeks and frontal area       Subjective:   Alaina Baca is a 60 y.o. female with hyperlipidemia. Cardiovascular risk analysis - 60 y.o. female LDL goal is under 100. ROS: taking medications as instructed, no medication side effects noted, no TIA's, no chest pain on exertion, no dyspnea on exertion, no swelling of ankles. Tolerating meds, no myalgias or other side effects noted  New concerns: tolerating zocor and doing well .    Lab Results   Component Value Date/Time    Cholesterol, total 190 2022 08:46 AM    HDL Cholesterol 63 2022 08:46 AM    LDL, calculated 111 (H) 2022 08:46 AM    LDL, calculated 133 (H) 2020 09:02 AM    VLDL, calculated 16 08/03/2022 08:46 AM    VLDL, calculated 24 01/23/2020 09:02 AM    Triglyceride 87 08/03/2022 08:46 AM       Neuropathy:  Pt reports doing well, noting that the Gabapentin is working and \"takes the edge off\" but that she would like to be off it if she could. GERD:   She is doing well with the Omeprazole. And symptoms are stable     Review of Systems   All other systems reviewed and are negative. Physical Exam  Vitals and nursing note reviewed. Constitutional:       Appearance: She is well-developed. HENT:      Head: Normocephalic and atraumatic. Right Ear: External ear normal.      Left Ear: External ear normal.      Nose: Nose normal.   Neck:      Thyroid: No thyroid mass or thyromegaly. Vascular: No carotid bruit or JVD. Cardiovascular:      Rate and Rhythm: Normal rate and regular rhythm. Pulses: Normal pulses. Heart sounds: Normal heart sounds, S1 normal and S2 normal. No murmur heard. No friction rub. No gallop. Pulmonary:      Effort: Pulmonary effort is normal.      Breath sounds: Normal breath sounds. Abdominal:      General: Bowel sounds are normal.      Palpations: Abdomen is soft. Musculoskeletal:         General: Normal range of motion. Cervical back: Normal range of motion and neck supple. Skin:     General: Skin is warm and dry. Neurological:      Mental Status: She is alert and oriented to person, place, and time. Psychiatric:         Behavior: Behavior normal.         Thought Content: Thought content normal.         Judgment: Judgment normal.         On this date 04/25/2023 I have spent 30 minutes reviewing previous notes, test results and face to face with the patient discussing the diagnosis and importance of compliance with the treatment plan as well as documenting on the day of the visit. An electronic signature was used to authenticate this note.   -- Nnamdi Lynch MD

## 2023-05-01 ENCOUNTER — TELEPHONE (OUTPATIENT)
Dept: INTERNAL MEDICINE CLINIC | Age: 63
End: 2023-05-01

## 2023-05-01 RX ORDER — LEVOFLOXACIN 500 MG/1
500 TABLET, FILM COATED ORAL DAILY
Qty: 7 TABLET | Refills: 0 | Status: SHIPPED | OUTPATIENT
Start: 2023-05-01 | End: 2023-05-02

## 2023-05-01 NOTE — TELEPHONE ENCOUNTER
Pt is calling states the z-pack is not working and wanted to know if  could send in something else.  Please advise

## 2023-05-01 NOTE — TELEPHONE ENCOUNTER
Spoke with patient and advised her that Dr. Kurt Pathak has sent a another antibiotic to her pharmacy.

## 2023-05-02 ENCOUNTER — TELEPHONE (OUTPATIENT)
Dept: INTERNAL MEDICINE CLINIC | Age: 63
End: 2023-05-02

## 2023-05-02 ENCOUNTER — OFFICE VISIT (OUTPATIENT)
Dept: INTERNAL MEDICINE CLINIC | Age: 63
End: 2023-05-02
Payer: COMMERCIAL

## 2023-05-02 VITALS
RESPIRATION RATE: 16 BRPM | HEIGHT: 61 IN | HEART RATE: 106 BPM | BODY MASS INDEX: 23.37 KG/M2 | SYSTOLIC BLOOD PRESSURE: 106 MMHG | DIASTOLIC BLOOD PRESSURE: 69 MMHG | WEIGHT: 123.8 LBS | OXYGEN SATURATION: 98 % | TEMPERATURE: 98.7 F

## 2023-05-02 DIAGNOSIS — J32.9 RECURRENT SINUS INFECTIONS: Primary | ICD-10-CM

## 2023-05-02 PROCEDURE — 99213 OFFICE O/P EST LOW 20 MIN: CPT | Performed by: INTERNAL MEDICINE

## 2023-05-02 RX ORDER — METHYLPREDNISOLONE 4 MG/1
TABLET ORAL
Qty: 1 DOSE PACK | Refills: 0 | Status: SHIPPED | OUTPATIENT
Start: 2023-05-02

## 2023-05-02 RX ORDER — DOXYCYCLINE 100 MG/1
100 CAPSULE ORAL 2 TIMES DAILY
Qty: 20 CAPSULE | Refills: 0 | Status: SHIPPED | OUTPATIENT
Start: 2023-05-02

## 2023-06-21 ENCOUNTER — TELEPHONE (OUTPATIENT)
Age: 63
End: 2023-06-21

## 2023-06-21 NOTE — TELEPHONE ENCOUNTER
----- Message from Marry Sandifer sent at 6/21/2023 10:08 AM EDT -----  Subject: Message to Provider    QUESTIONS  Information for Provider? Pt had a cpe schedule for August and had to   reschedule. She couldn't get in until January. She is wondering if you   want her to get blood work done in August to see if any changes need to be   made for her medication. Please call pt to advise.  ---------------------------------------------------------------------------  --------------  Zane CUETO  [phone]; OK to leave message on voicemail  ---------------------------------------------------------------------------  --------------  SCRIPT ANSWERS  Relationship to Patient?  Self

## 2023-07-24 DIAGNOSIS — E78.5 HYPERLIPIDEMIA, UNSPECIFIED: ICD-10-CM

## 2023-07-24 RX ORDER — SIMVASTATIN 20 MG
TABLET ORAL NIGHTLY
Qty: 90 TABLET | Refills: 3 | Status: SHIPPED | OUTPATIENT
Start: 2023-07-24

## 2023-08-21 ENCOUNTER — TELEPHONE (OUTPATIENT)
Age: 63
End: 2023-08-21

## 2023-08-21 RX ORDER — ERYTHROMYCIN 5 MG/G
OINTMENT OPHTHALMIC
Qty: 3.5 G | Refills: 2 | Status: SHIPPED | OUTPATIENT
Start: 2023-08-21 | End: 2023-08-31

## 2023-08-21 NOTE — TELEPHONE ENCOUNTER
Spoke with patient and advised her that Dr. Sanjana Berg sent in a rx. Pt understood and was thankful for the call.

## 2023-08-21 NOTE — TELEPHONE ENCOUNTER
Patient is calling states she has a stye on her eyelid. Patient would like to know if  could call her in something to her pharmacy.  Please advise

## 2023-10-31 ENCOUNTER — OFFICE VISIT (OUTPATIENT)
Age: 63
End: 2023-10-31
Payer: COMMERCIAL

## 2023-10-31 VITALS
RESPIRATION RATE: 18 BRPM | HEART RATE: 98 BPM | BODY MASS INDEX: 22.66 KG/M2 | OXYGEN SATURATION: 98 % | WEIGHT: 120 LBS | HEIGHT: 61 IN | TEMPERATURE: 99.1 F | DIASTOLIC BLOOD PRESSURE: 79 MMHG | SYSTOLIC BLOOD PRESSURE: 117 MMHG

## 2023-10-31 DIAGNOSIS — J02.9 SORE THROAT: ICD-10-CM

## 2023-10-31 DIAGNOSIS — J06.9 UPPER RESPIRATORY TRACT INFECTION, UNSPECIFIED TYPE: Primary | ICD-10-CM

## 2023-10-31 DIAGNOSIS — J02.0 STREP PHARYNGITIS: ICD-10-CM

## 2023-10-31 LAB
GROUP A STREP ANTIGEN, POC: POSITIVE
VALID INTERNAL CONTROL, POC: YES

## 2023-10-31 PROCEDURE — 99213 OFFICE O/P EST LOW 20 MIN: CPT | Performed by: INTERNAL MEDICINE

## 2023-10-31 PROCEDURE — 87880 STREP A ASSAY W/OPTIC: CPT | Performed by: INTERNAL MEDICINE

## 2023-10-31 RX ORDER — AZITHROMYCIN 250 MG/1
250 TABLET, FILM COATED ORAL SEE ADMIN INSTRUCTIONS
Qty: 6 TABLET | Refills: 0 | Status: SHIPPED | OUTPATIENT
Start: 2023-10-31 | End: 2023-11-05

## 2023-10-31 RX ORDER — GUAIFENESIN 600 MG/1
600 TABLET, EXTENDED RELEASE ORAL 2 TIMES DAILY
COMMUNITY

## 2023-10-31 SDOH — ECONOMIC STABILITY: FOOD INSECURITY: WITHIN THE PAST 12 MONTHS, YOU WORRIED THAT YOUR FOOD WOULD RUN OUT BEFORE YOU GOT MONEY TO BUY MORE.: NEVER TRUE

## 2023-10-31 SDOH — ECONOMIC STABILITY: FOOD INSECURITY: WITHIN THE PAST 12 MONTHS, THE FOOD YOU BOUGHT JUST DIDN'T LAST AND YOU DIDN'T HAVE MONEY TO GET MORE.: NEVER TRUE

## 2023-10-31 SDOH — ECONOMIC STABILITY: INCOME INSECURITY: HOW HARD IS IT FOR YOU TO PAY FOR THE VERY BASICS LIKE FOOD, HOUSING, MEDICAL CARE, AND HEATING?: NOT HARD AT ALL

## 2023-10-31 SDOH — ECONOMIC STABILITY: HOUSING INSECURITY
IN THE LAST 12 MONTHS, WAS THERE A TIME WHEN YOU DID NOT HAVE A STEADY PLACE TO SLEEP OR SLEPT IN A SHELTER (INCLUDING NOW)?: NO

## 2023-10-31 ASSESSMENT — ENCOUNTER SYMPTOMS
SINUS PAIN: 1
CONSTIPATION: 0
SHORTNESS OF BREATH: 0
ABDOMINAL PAIN: 0
SINUS PRESSURE: 1
CHEST TIGHTNESS: 0
COUGH: 1
BACK PAIN: 0
DIARRHEA: 0

## 2023-10-31 ASSESSMENT — PATIENT HEALTH QUESTIONNAIRE - PHQ9
SUM OF ALL RESPONSES TO PHQ QUESTIONS 1-9: 0
1. LITTLE INTEREST OR PLEASURE IN DOING THINGS: 0
SUM OF ALL RESPONSES TO PHQ9 QUESTIONS 1 & 2: 0
SUM OF ALL RESPONSES TO PHQ QUESTIONS 1-9: 0
SUM OF ALL RESPONSES TO PHQ QUESTIONS 1-9: 0
2. FEELING DOWN, DEPRESSED OR HOPELESS: 0
SUM OF ALL RESPONSES TO PHQ QUESTIONS 1-9: 0

## 2024-06-25 DIAGNOSIS — E78.5 HYPERLIPIDEMIA, UNSPECIFIED: ICD-10-CM

## 2024-06-25 RX ORDER — SIMVASTATIN 20 MG
20 TABLET ORAL NIGHTLY
Qty: 30 TABLET | Refills: 0 | Status: SHIPPED | OUTPATIENT
Start: 2024-06-25

## 2024-07-27 DIAGNOSIS — E78.5 HYPERLIPIDEMIA, UNSPECIFIED: ICD-10-CM

## 2024-07-29 RX ORDER — SIMVASTATIN 20 MG
20 TABLET ORAL NIGHTLY
Qty: 90 TABLET | Refills: 0 | Status: SHIPPED | OUTPATIENT
Start: 2024-07-29

## 2024-10-25 DIAGNOSIS — E78.5 HYPERLIPIDEMIA, UNSPECIFIED: ICD-10-CM

## 2024-10-25 RX ORDER — SIMVASTATIN 20 MG
20 TABLET ORAL NIGHTLY
Qty: 90 TABLET | Refills: 0 | OUTPATIENT
Start: 2024-10-25

## 2025-03-12 DIAGNOSIS — E78.5 HYPERLIPIDEMIA, UNSPECIFIED: ICD-10-CM

## 2025-03-12 RX ORDER — SIMVASTATIN 20 MG
20 TABLET ORAL NIGHTLY
Qty: 90 TABLET | Refills: 0 | OUTPATIENT
Start: 2025-03-12

## 2025-03-23 NOTE — PROGRESS NOTES
Janet Monroy (:  1960) is a 65 y.o. female,Established patient, here for evaluation of the following chief complaint(s):  Medication Check (Patient states she has been having lower right sided back pain recently) and Medicare AWV          Diagnosis Orders   1. Medicare annual wellness visit, initial        2. Hyperlipidemia LDL goal <100  Lipid Panel    Comprehensive Metabolic Panel    TSH      3. Gastro-esophageal reflux disease without esophagitis  CBC      4. Chronic bilateral low back pain without sciatica        5. History of ovarian cancer        6. Post-menopausal  DEXA BONE DENSITY AXIAL SKELETON       Simvastatin  Prilosec        Vaccines   Bone density  Assessment & Plan  1. Back pain.  - The patient reports back pain that started in the last few days, likely due to lifting heavy objects while assisting her .  - She has been managing the pain with ibuprofen and heat application at night.  - A prescription for a muscle relaxant will be provided, which she can fill if the pain persists.  - Physical therapy and stretches were discussed as potential options for managing her back pain.    2. Hypercholesterolemia.  - She is currently taking simvastatin for high cholesterol and tolerating it well.  - No adverse effects reported from simvastatin use.  - A prescription for simvastatin will be sent in for a year.  - Blood work will be ordered today to check cholesterol levels.    3. Health maintenance.  - She has not had a bone density test in a long time and has a history of osteopenia.  - A bone density test will be ordered to assess her current bone health.  - A comprehensive blood work panel will be ordered today to check kidney function, liver function, and cholesterol levels.  - The importance of regular dental check-ups was discussed, and she was advised to schedule an appointment for teeth cleaning.    4. Preventive care.  - She will receive her pneumonia vaccine during this visit.  -

## 2025-03-25 ENCOUNTER — OFFICE VISIT (OUTPATIENT)
Facility: CLINIC | Age: 65
End: 2025-03-25
Payer: MEDICARE

## 2025-03-25 VITALS
RESPIRATION RATE: 16 BRPM | TEMPERATURE: 98.3 F | OXYGEN SATURATION: 96 % | DIASTOLIC BLOOD PRESSURE: 71 MMHG | SYSTOLIC BLOOD PRESSURE: 104 MMHG | BODY MASS INDEX: 21.98 KG/M2 | HEIGHT: 61 IN | HEART RATE: 86 BPM | WEIGHT: 116.4 LBS

## 2025-03-25 DIAGNOSIS — Z00.00 WELCOME TO MEDICARE PREVENTIVE VISIT: ICD-10-CM

## 2025-03-25 DIAGNOSIS — E78.5 HYPERLIPIDEMIA LDL GOAL <100: ICD-10-CM

## 2025-03-25 DIAGNOSIS — K21.9 GASTRO-ESOPHAGEAL REFLUX DISEASE WITHOUT ESOPHAGITIS: ICD-10-CM

## 2025-03-25 DIAGNOSIS — M54.50 CHRONIC BILATERAL LOW BACK PAIN WITHOUT SCIATICA: ICD-10-CM

## 2025-03-25 DIAGNOSIS — Z78.0 POST-MENOPAUSAL: ICD-10-CM

## 2025-03-25 DIAGNOSIS — G89.29 CHRONIC BILATERAL LOW BACK PAIN WITHOUT SCIATICA: ICD-10-CM

## 2025-03-25 DIAGNOSIS — Z85.43 HISTORY OF OVARIAN CANCER: ICD-10-CM

## 2025-03-25 DIAGNOSIS — Z00.00 MEDICARE ANNUAL WELLNESS VISIT, INITIAL: Primary | ICD-10-CM

## 2025-03-25 LAB
ALBUMIN SERPL-MCNC: 4.2 G/DL (ref 3.5–5)
ALBUMIN/GLOB SERPL: 1.4 (ref 1.1–2.2)
ALP SERPL-CCNC: 101 U/L (ref 45–117)
ALT SERPL-CCNC: 11 U/L (ref 12–78)
ANION GAP SERPL CALC-SCNC: 6 MMOL/L (ref 2–12)
AST SERPL-CCNC: 25 U/L (ref 15–37)
BILIRUB SERPL-MCNC: 0.4 MG/DL (ref 0.2–1)
BUN SERPL-MCNC: 21 MG/DL (ref 6–20)
BUN/CREAT SERPL: 22 (ref 12–20)
CALCIUM SERPL-MCNC: 9.5 MG/DL (ref 8.5–10.1)
CHLORIDE SERPL-SCNC: 106 MMOL/L (ref 97–108)
CHOLEST SERPL-MCNC: 209 MG/DL
CO2 SERPL-SCNC: 26 MMOL/L (ref 21–32)
CREAT SERPL-MCNC: 0.95 MG/DL (ref 0.55–1.02)
ERYTHROCYTE [DISTWIDTH] IN BLOOD BY AUTOMATED COUNT: 12.5 % (ref 11.5–14.5)
GLOBULIN SER CALC-MCNC: 3 G/DL (ref 2–4)
GLUCOSE SERPL-MCNC: 90 MG/DL (ref 65–100)
HCT VFR BLD AUTO: 43 % (ref 35–47)
HDLC SERPL-MCNC: 75 MG/DL
HDLC SERPL: 2.8 (ref 0–5)
HGB BLD-MCNC: 14.1 G/DL (ref 11.5–16)
LDLC SERPL CALC-MCNC: 117.8 MG/DL (ref 0–100)
MCH RBC QN AUTO: 31.3 PG (ref 26–34)
MCHC RBC AUTO-ENTMCNC: 32.8 G/DL (ref 30–36.5)
MCV RBC AUTO: 95.6 FL (ref 80–99)
NRBC # BLD: 0 K/UL (ref 0–0.01)
NRBC BLD-RTO: 0 PER 100 WBC
PLATELET # BLD AUTO: 267 K/UL (ref 150–400)
PMV BLD AUTO: 10.3 FL (ref 8.9–12.9)
POTASSIUM SERPL-SCNC: 4 MMOL/L (ref 3.5–5.1)
PROT SERPL-MCNC: 7.2 G/DL (ref 6.4–8.2)
RBC # BLD AUTO: 4.5 M/UL (ref 3.8–5.2)
SODIUM SERPL-SCNC: 138 MMOL/L (ref 136–145)
TRIGL SERPL-MCNC: 81 MG/DL
TSH SERPL DL<=0.05 MIU/L-ACNC: 2.84 UIU/ML (ref 0.36–3.74)
VLDLC SERPL CALC-MCNC: 16.2 MG/DL
WBC # BLD AUTO: 6.1 K/UL (ref 3.6–11)

## 2025-03-25 PROCEDURE — 90677 PCV20 VACCINE IM: CPT | Performed by: INTERNAL MEDICINE

## 2025-03-25 PROCEDURE — 99214 OFFICE O/P EST MOD 30 MIN: CPT | Performed by: INTERNAL MEDICINE

## 2025-03-25 PROCEDURE — G0009 ADMIN PNEUMOCOCCAL VACCINE: HCPCS | Performed by: INTERNAL MEDICINE

## 2025-03-25 PROCEDURE — G0438 PPPS, INITIAL VISIT: HCPCS | Performed by: INTERNAL MEDICINE

## 2025-03-25 PROCEDURE — 1160F RVW MEDS BY RX/DR IN RCRD: CPT | Performed by: INTERNAL MEDICINE

## 2025-03-25 PROCEDURE — 1159F MED LIST DOCD IN RCRD: CPT | Performed by: INTERNAL MEDICINE

## 2025-03-25 PROCEDURE — 1123F ACP DISCUSS/DSCN MKR DOCD: CPT | Performed by: INTERNAL MEDICINE

## 2025-03-25 PROCEDURE — 1125F AMNT PAIN NOTED PAIN PRSNT: CPT | Performed by: INTERNAL MEDICINE

## 2025-03-25 RX ORDER — SIMVASTATIN 20 MG
20 TABLET ORAL NIGHTLY
Qty: 90 TABLET | Refills: 3 | Status: SHIPPED | OUTPATIENT
Start: 2025-03-25

## 2025-03-25 SDOH — ECONOMIC STABILITY: FOOD INSECURITY: WITHIN THE PAST 12 MONTHS, YOU WORRIED THAT YOUR FOOD WOULD RUN OUT BEFORE YOU GOT MONEY TO BUY MORE.: NEVER TRUE

## 2025-03-25 SDOH — ECONOMIC STABILITY: FOOD INSECURITY: WITHIN THE PAST 12 MONTHS, THE FOOD YOU BOUGHT JUST DIDN'T LAST AND YOU DIDN'T HAVE MONEY TO GET MORE.: NEVER TRUE

## 2025-03-25 ASSESSMENT — LIFESTYLE VARIABLES
HOW OFTEN DO YOU HAVE A DRINK CONTAINING ALCOHOL: 2-4 TIMES A MONTH
HOW MANY STANDARD DRINKS CONTAINING ALCOHOL DO YOU HAVE ON A TYPICAL DAY: 1 OR 2

## 2025-03-25 ASSESSMENT — PATIENT HEALTH QUESTIONNAIRE - PHQ9
1. LITTLE INTEREST OR PLEASURE IN DOING THINGS: NOT AT ALL
2. FEELING DOWN, DEPRESSED OR HOPELESS: NOT AT ALL
SUM OF ALL RESPONSES TO PHQ QUESTIONS 1-9: 0
2. FEELING DOWN, DEPRESSED OR HOPELESS: NOT AT ALL
SUM OF ALL RESPONSES TO PHQ QUESTIONS 1-9: 0
1. LITTLE INTEREST OR PLEASURE IN DOING THINGS: NOT AT ALL
SUM OF ALL RESPONSES TO PHQ QUESTIONS 1-9: 0